# Patient Record
Sex: FEMALE | Race: WHITE | NOT HISPANIC OR LATINO | Employment: FULL TIME | URBAN - METROPOLITAN AREA
[De-identification: names, ages, dates, MRNs, and addresses within clinical notes are randomized per-mention and may not be internally consistent; named-entity substitution may affect disease eponyms.]

---

## 2017-05-03 ENCOUNTER — GENERIC CONVERSION - ENCOUNTER (OUTPATIENT)
Dept: OTHER | Facility: OTHER | Age: 32
End: 2017-05-03

## 2017-05-19 ENCOUNTER — ALLSCRIPTS OFFICE VISIT (OUTPATIENT)
Dept: OTHER | Facility: OTHER | Age: 32
End: 2017-05-19

## 2017-05-19 DIAGNOSIS — E78.00 PURE HYPERCHOLESTEROLEMIA: ICD-10-CM

## 2017-05-19 DIAGNOSIS — E66.01 MORBID (SEVERE) OBESITY DUE TO EXCESS CALORIES (HCC): ICD-10-CM

## 2017-05-19 DIAGNOSIS — I10 ESSENTIAL (PRIMARY) HYPERTENSION: ICD-10-CM

## 2017-06-06 ENCOUNTER — ANESTHESIA EVENT (OUTPATIENT)
Dept: GASTROENTEROLOGY | Facility: HOSPITAL | Age: 32
End: 2017-06-06
Payer: COMMERCIAL

## 2017-06-06 RX ORDER — CHOLECALCIFEROL (VITAMIN D3) 50 MCG
2000 TABLET ORAL DAILY
COMMUNITY
End: 2018-02-16 | Stop reason: ALTCHOICE

## 2017-06-06 RX ORDER — ROSUVASTATIN CALCIUM 5 MG/1
5 TABLET, COATED ORAL EVERY EVENING
COMMUNITY
End: 2018-08-13 | Stop reason: SDUPTHER

## 2017-06-06 RX ORDER — DIPHENOXYLATE HYDROCHLORIDE AND ATROPINE SULFATE 2.5; .025 MG/1; MG/1
1 TABLET ORAL DAILY
COMMUNITY

## 2017-06-07 ENCOUNTER — ANESTHESIA (OUTPATIENT)
Dept: GASTROENTEROLOGY | Facility: HOSPITAL | Age: 32
End: 2017-06-07
Payer: COMMERCIAL

## 2017-06-07 ENCOUNTER — HOSPITAL ENCOUNTER (OUTPATIENT)
Facility: HOSPITAL | Age: 32
Setting detail: OUTPATIENT SURGERY
Discharge: HOME | End: 2017-06-07
Attending: SURGERY | Admitting: SURGERY
Payer: COMMERCIAL

## 2017-06-07 VITALS
SYSTOLIC BLOOD PRESSURE: 129 MMHG | HEIGHT: 62 IN | HEART RATE: 82 BPM | OXYGEN SATURATION: 95 % | DIASTOLIC BLOOD PRESSURE: 83 MMHG | TEMPERATURE: 97.5 F | RESPIRATION RATE: 20 BRPM | WEIGHT: 231.5 LBS | BODY MASS INDEX: 42.6 KG/M2

## 2017-06-07 DIAGNOSIS — E66.9 OBESITY: ICD-10-CM

## 2017-06-07 LAB — EXT PREGNANCY TEST URINE: NEGATIVE

## 2017-06-07 PROCEDURE — 88342 IMHCHEM/IMCYTCHM 1ST ANTB: CPT | Performed by: SURGERY

## 2017-06-07 PROCEDURE — 88305 TISSUE EXAM BY PATHOLOGIST: CPT | Performed by: SURGERY

## 2017-06-07 PROCEDURE — 81025 URINE PREGNANCY TEST: CPT | Performed by: ANESTHESIOLOGY

## 2017-06-07 RX ORDER — SODIUM CHLORIDE 9 MG/ML
125 INJECTION, SOLUTION INTRAVENOUS CONTINUOUS
Status: DISCONTINUED | OUTPATIENT
Start: 2017-06-07 | End: 2017-06-07 | Stop reason: HOSPADM

## 2017-06-07 RX ORDER — PROPOFOL 10 MG/ML
INJECTION, EMULSION INTRAVENOUS AS NEEDED
Status: DISCONTINUED | OUTPATIENT
Start: 2017-06-07 | End: 2017-06-07 | Stop reason: SURG

## 2017-06-07 RX ORDER — ALBUTEROL SULFATE 2.5 MG/3ML
2.5 SOLUTION RESPIRATORY (INHALATION) AS NEEDED
COMMUNITY
End: 2019-08-20

## 2017-06-07 RX ADMIN — PROPOFOL 100 MG: 10 INJECTION, EMULSION INTRAVENOUS at 13:03

## 2017-06-07 RX ADMIN — PROPOFOL 100 MG: 10 INJECTION, EMULSION INTRAVENOUS at 13:05

## 2017-06-07 RX ADMIN — SODIUM CHLORIDE 125 ML/HR: 0.9 INJECTION, SOLUTION INTRAVENOUS at 12:06

## 2017-06-07 RX ADMIN — LIDOCAINE HYDROCHLORIDE 50 MG: 20 INJECTION, SOLUTION INTRAVENOUS at 13:03

## 2017-06-09 ENCOUNTER — GENERIC CONVERSION - ENCOUNTER (OUTPATIENT)
Dept: OTHER | Facility: OTHER | Age: 32
End: 2017-06-09

## 2017-06-20 ENCOUNTER — GENERIC CONVERSION - ENCOUNTER (OUTPATIENT)
Dept: OTHER | Facility: OTHER | Age: 32
End: 2017-06-20

## 2017-07-03 ENCOUNTER — GENERIC CONVERSION - ENCOUNTER (OUTPATIENT)
Dept: OTHER | Facility: OTHER | Age: 32
End: 2017-07-03

## 2017-07-27 ENCOUNTER — ALLSCRIPTS OFFICE VISIT (OUTPATIENT)
Dept: OTHER | Facility: OTHER | Age: 32
End: 2017-07-27

## 2017-07-27 ENCOUNTER — ANESTHESIA EVENT (OUTPATIENT)
Dept: PERIOP | Facility: HOSPITAL | Age: 32
DRG: 621 | End: 2017-07-27
Payer: COMMERCIAL

## 2017-08-01 ENCOUNTER — GENERIC CONVERSION - ENCOUNTER (OUTPATIENT)
Dept: OTHER | Facility: OTHER | Age: 32
End: 2017-08-01

## 2017-08-07 ENCOUNTER — ANESTHESIA (OUTPATIENT)
Dept: PERIOP | Facility: HOSPITAL | Age: 32
DRG: 621 | End: 2017-08-07
Payer: COMMERCIAL

## 2017-08-07 ENCOUNTER — HOSPITAL ENCOUNTER (INPATIENT)
Facility: HOSPITAL | Age: 32
LOS: 1 days | Discharge: HOME | DRG: 621 | End: 2017-08-08
Attending: SURGERY | Admitting: SURGERY
Payer: COMMERCIAL

## 2017-08-07 DIAGNOSIS — E78.5 HYPERLIPIDEMIA, UNSPECIFIED HYPERLIPIDEMIA TYPE: ICD-10-CM

## 2017-08-07 DIAGNOSIS — E66.01 MORBID OBESITY, UNSPECIFIED OBESITY TYPE (HCC): Primary | ICD-10-CM

## 2017-08-07 LAB — EXT PREGNANCY TEST URINE: NEGATIVE

## 2017-08-07 PROCEDURE — 0D164ZA BYPASS STOMACH TO JEJUNUM, PERCUTANEOUS ENDOSCOPIC APPROACH: ICD-10-PCS | Performed by: SURGERY

## 2017-08-07 PROCEDURE — 94760 N-INVAS EAR/PLS OXIMETRY 1: CPT

## 2017-08-07 PROCEDURE — 81025 URINE PREGNANCY TEST: CPT | Performed by: ANESTHESIOLOGY

## 2017-08-07 RX ORDER — ALBUTEROL SULFATE 2.5 MG/3ML
2.5 SOLUTION RESPIRATORY (INHALATION) AS NEEDED
Status: DISCONTINUED | OUTPATIENT
Start: 2017-08-07 | End: 2017-08-07

## 2017-08-07 RX ORDER — PROMETHAZINE HYDROCHLORIDE 25 MG/ML
25 INJECTION, SOLUTION INTRAMUSCULAR; INTRAVENOUS EVERY 4 HOURS PRN
Status: DISCONTINUED | OUTPATIENT
Start: 2017-08-07 | End: 2017-08-08 | Stop reason: HOSPADM

## 2017-08-07 RX ORDER — MORPHINE SULFATE 4 MG/ML
4 INJECTION, SOLUTION INTRAMUSCULAR; INTRAVENOUS EVERY 2 HOUR PRN
Status: DISCONTINUED | OUTPATIENT
Start: 2017-08-07 | End: 2017-08-08 | Stop reason: HOSPADM

## 2017-08-07 RX ORDER — ONDANSETRON 2 MG/ML
INJECTION INTRAMUSCULAR; INTRAVENOUS AS NEEDED
Status: DISCONTINUED | OUTPATIENT
Start: 2017-08-07 | End: 2017-08-07 | Stop reason: SURG

## 2017-08-07 RX ORDER — ONDANSETRON 2 MG/ML
4 INJECTION INTRAMUSCULAR; INTRAVENOUS ONCE AS NEEDED
Status: COMPLETED | OUTPATIENT
Start: 2017-08-07 | End: 2017-08-07

## 2017-08-07 RX ORDER — ACETAMINOPHEN 160 MG/5ML
320 SUSPENSION, ORAL (FINAL DOSE FORM) ORAL EVERY 4 HOURS PRN
Status: DISCONTINUED | OUTPATIENT
Start: 2017-08-07 | End: 2017-08-08 | Stop reason: HOSPADM

## 2017-08-07 RX ORDER — MAGNESIUM HYDROXIDE 1200 MG/15ML
LIQUID ORAL AS NEEDED
Status: DISCONTINUED | OUTPATIENT
Start: 2017-08-07 | End: 2017-08-07 | Stop reason: HOSPADM

## 2017-08-07 RX ORDER — MIDAZOLAM HYDROCHLORIDE 1 MG/ML
INJECTION INTRAMUSCULAR; INTRAVENOUS AS NEEDED
Status: DISCONTINUED | OUTPATIENT
Start: 2017-08-07 | End: 2017-08-07 | Stop reason: SURG

## 2017-08-07 RX ORDER — LEVOFLOXACIN 5 MG/ML
750 INJECTION, SOLUTION INTRAVENOUS ONCE
Status: COMPLETED | OUTPATIENT
Start: 2017-08-07 | End: 2017-08-07

## 2017-08-07 RX ORDER — ALBUTEROL SULFATE 2.5 MG/3ML
2.5 SOLUTION RESPIRATORY (INHALATION) EVERY 6 HOURS PRN
Status: DISCONTINUED | OUTPATIENT
Start: 2017-08-07 | End: 2017-08-07

## 2017-08-07 RX ORDER — HEPARIN SODIUM 5000 [USP'U]/ML
5000 INJECTION, SOLUTION INTRAVENOUS; SUBCUTANEOUS ONCE
Status: COMPLETED | OUTPATIENT
Start: 2017-08-07 | End: 2017-08-07

## 2017-08-07 RX ORDER — SCOLOPAMINE TRANSDERMAL SYSTEM 1 MG/1
1 PATCH, EXTENDED RELEASE TRANSDERMAL ONCE AS NEEDED
Status: DISCONTINUED | OUTPATIENT
Start: 2017-08-07 | End: 2017-08-07

## 2017-08-07 RX ORDER — SODIUM CHLORIDE, SODIUM LACTATE, POTASSIUM CHLORIDE, CALCIUM CHLORIDE 600; 310; 30; 20 MG/100ML; MG/100ML; MG/100ML; MG/100ML
50 INJECTION, SOLUTION INTRAVENOUS CONTINUOUS
Status: DISCONTINUED | OUTPATIENT
Start: 2017-08-07 | End: 2017-08-08 | Stop reason: HOSPADM

## 2017-08-07 RX ORDER — OXYCODONE HCL 5 MG/5 ML
10 SOLUTION, ORAL ORAL EVERY 4 HOURS PRN
Status: DISCONTINUED | OUTPATIENT
Start: 2017-08-07 | End: 2017-08-08 | Stop reason: HOSPADM

## 2017-08-07 RX ORDER — EPHEDRINE SULFATE 50 MG/ML
INJECTION, SOLUTION INTRAVENOUS AS NEEDED
Status: DISCONTINUED | OUTPATIENT
Start: 2017-08-07 | End: 2017-08-07 | Stop reason: SURG

## 2017-08-07 RX ORDER — GLYCOPYRROLATE 0.2 MG/ML
INJECTION INTRAMUSCULAR; INTRAVENOUS AS NEEDED
Status: DISCONTINUED | OUTPATIENT
Start: 2017-08-07 | End: 2017-08-07 | Stop reason: SURG

## 2017-08-07 RX ORDER — PANTOPRAZOLE SODIUM 40 MG/1
40 INJECTION, POWDER, FOR SOLUTION INTRAVENOUS
Status: DISCONTINUED | OUTPATIENT
Start: 2017-08-08 | End: 2017-08-08 | Stop reason: HOSPADM

## 2017-08-07 RX ORDER — MORPHINE SULFATE 2 MG/ML
2 INJECTION, SOLUTION INTRAMUSCULAR; INTRAVENOUS EVERY 2 HOUR PRN
Status: DISCONTINUED | OUTPATIENT
Start: 2017-08-07 | End: 2017-08-08 | Stop reason: HOSPADM

## 2017-08-07 RX ORDER — FENTANYL CITRATE 50 UG/ML
INJECTION, SOLUTION INTRAMUSCULAR; INTRAVENOUS AS NEEDED
Status: DISCONTINUED | OUTPATIENT
Start: 2017-08-07 | End: 2017-08-07 | Stop reason: SURG

## 2017-08-07 RX ORDER — ROCURONIUM BROMIDE 10 MG/ML
INJECTION, SOLUTION INTRAVENOUS AS NEEDED
Status: DISCONTINUED | OUTPATIENT
Start: 2017-08-07 | End: 2017-08-07 | Stop reason: SURG

## 2017-08-07 RX ORDER — PROPOFOL 10 MG/ML
INJECTION, EMULSION INTRAVENOUS AS NEEDED
Status: DISCONTINUED | OUTPATIENT
Start: 2017-08-07 | End: 2017-08-07 | Stop reason: SURG

## 2017-08-07 RX ORDER — OXYCODONE HCL 5 MG/5 ML
5 SOLUTION, ORAL ORAL EVERY 4 HOURS PRN
Status: DISCONTINUED | OUTPATIENT
Start: 2017-08-07 | End: 2017-08-08 | Stop reason: HOSPADM

## 2017-08-07 RX ORDER — HYDROMORPHONE HYDROCHLORIDE 2 MG/ML
INJECTION, SOLUTION INTRAMUSCULAR; INTRAVENOUS; SUBCUTANEOUS AS NEEDED
Status: DISCONTINUED | OUTPATIENT
Start: 2017-08-07 | End: 2017-08-07 | Stop reason: SURG

## 2017-08-07 RX ORDER — ONDANSETRON 2 MG/ML
4 INJECTION INTRAMUSCULAR; INTRAVENOUS EVERY 4 HOURS PRN
Status: DISCONTINUED | OUTPATIENT
Start: 2017-08-07 | End: 2017-08-08 | Stop reason: HOSPADM

## 2017-08-07 RX ORDER — SODIUM CHLORIDE 9 MG/ML
125 INJECTION, SOLUTION INTRAVENOUS CONTINUOUS
Status: DISCONTINUED | OUTPATIENT
Start: 2017-08-07 | End: 2017-08-07

## 2017-08-07 RX ORDER — SERTRALINE HYDROCHLORIDE 25 MG/1
50 TABLET, FILM COATED ORAL
Status: DISCONTINUED | OUTPATIENT
Start: 2017-08-07 | End: 2017-08-08 | Stop reason: HOSPADM

## 2017-08-07 RX ORDER — BUPIVACAINE HYDROCHLORIDE AND EPINEPHRINE 5; 5 MG/ML; UG/ML
INJECTION, SOLUTION PERINEURAL AS NEEDED
Status: DISCONTINUED | OUTPATIENT
Start: 2017-08-07 | End: 2017-08-07 | Stop reason: HOSPADM

## 2017-08-07 RX ADMIN — HYDROMORPHONE HYDROCHLORIDE 0.5 MG: 2 INJECTION, SOLUTION INTRAMUSCULAR; INTRAVENOUS; SUBCUTANEOUS at 15:05

## 2017-08-07 RX ADMIN — ROCURONIUM BROMIDE 10 MG: 10 INJECTION, SOLUTION INTRAVENOUS at 14:07

## 2017-08-07 RX ADMIN — HYDROMORPHONE HYDROCHLORIDE 0.25 MG: 2 INJECTION, SOLUTION INTRAMUSCULAR; INTRAVENOUS; SUBCUTANEOUS at 14:55

## 2017-08-07 RX ADMIN — SODIUM CHLORIDE 125 ML/HR: 0.9 INJECTION, SOLUTION INTRAVENOUS at 11:27

## 2017-08-07 RX ADMIN — HYDROMORPHONE HYDROCHLORIDE 0.5 MG: 2 INJECTION, SOLUTION INTRAMUSCULAR; INTRAVENOUS; SUBCUTANEOUS at 14:05

## 2017-08-07 RX ADMIN — EPHEDRINE SULFATE 5 MG: 50 INJECTION, SOLUTION INTRAMUSCULAR; INTRAVENOUS; SUBCUTANEOUS at 13:20

## 2017-08-07 RX ADMIN — ONDANSETRON 4 MG: 2 INJECTION INTRAMUSCULAR; INTRAVENOUS at 22:00

## 2017-08-07 RX ADMIN — SCOPALAMINE 1 PATCH: 1 PATCH, EXTENDED RELEASE TRANSDERMAL at 11:28

## 2017-08-07 RX ADMIN — FENTANYL CITRATE 50 MCG: 50 INJECTION, SOLUTION INTRAMUSCULAR; INTRAVENOUS at 13:30

## 2017-08-07 RX ADMIN — FENTANYL CITRATE 50 MCG: 50 INJECTION, SOLUTION INTRAMUSCULAR; INTRAVENOUS at 13:26

## 2017-08-07 RX ADMIN — SODIUM CHLORIDE, SODIUM LACTATE, POTASSIUM CHLORIDE, AND CALCIUM CHLORIDE 125 ML/HR: .6; .31; .03; .02 INJECTION, SOLUTION INTRAVENOUS at 16:20

## 2017-08-07 RX ADMIN — GLYCOPYRROLATE 0.4 MG: 0.2 INJECTION, SOLUTION INTRAMUSCULAR; INTRAVENOUS at 14:53

## 2017-08-07 RX ADMIN — HYDROMORPHONE HYDROCHLORIDE 0.25 MG: 2 INJECTION, SOLUTION INTRAMUSCULAR; INTRAVENOUS; SUBCUTANEOUS at 14:54

## 2017-08-07 RX ADMIN — PROMETHAZINE HYDROCHLORIDE 25 MG: 25 INJECTION INTRAMUSCULAR; INTRAVENOUS at 18:12

## 2017-08-07 RX ADMIN — METRONIDAZOLE 500 MG: 500 INJECTION, SOLUTION INTRAVENOUS at 18:14

## 2017-08-07 RX ADMIN — LEVOFLOXACIN: 5 INJECTION, SOLUTION INTRAVENOUS at 13:08

## 2017-08-07 RX ADMIN — PROPOFOL 200 MG: 10 INJECTION, EMULSION INTRAVENOUS at 13:03

## 2017-08-07 RX ADMIN — ONDANSETRON HYDROCHLORIDE 4 MG: 2 INJECTION, SOLUTION INTRAVENOUS at 13:28

## 2017-08-07 RX ADMIN — HYDROMORPHONE HYDROCHLORIDE 0.5 MG: 1 INJECTION, SOLUTION INTRAMUSCULAR; INTRAVENOUS; SUBCUTANEOUS at 15:52

## 2017-08-07 RX ADMIN — TRIMETHOBENZAMIDE HYDROCHLORIDE 200 MG: 100 INJECTION INTRAMUSCULAR at 15:43

## 2017-08-07 RX ADMIN — ONDANSETRON 4 MG: 2 INJECTION INTRAMUSCULAR; INTRAVENOUS at 15:30

## 2017-08-07 RX ADMIN — HYDROMORPHONE HYDROCHLORIDE 0.5 MG: 2 INJECTION, SOLUTION INTRAMUSCULAR; INTRAVENOUS; SUBCUTANEOUS at 14:12

## 2017-08-07 RX ADMIN — HEPARIN SODIUM 5000 UNITS: 5000 INJECTION, SOLUTION INTRAVENOUS; SUBCUTANEOUS at 12:20

## 2017-08-07 RX ADMIN — ONDANSETRON HYDROCHLORIDE 4 MG: 2 INJECTION, SOLUTION INTRAVENOUS at 13:32

## 2017-08-07 RX ADMIN — MIDAZOLAM HYDROCHLORIDE 2 MG: 1 INJECTION, SOLUTION INTRAMUSCULAR; INTRAVENOUS at 12:54

## 2017-08-07 RX ADMIN — DEXAMETHASONE SODIUM PHOSPHATE 4 MG: 10 INJECTION INTRAMUSCULAR; INTRAVENOUS at 13:28

## 2017-08-07 RX ADMIN — NEOSTIGMINE METHYLSULFATE 2.5 MG: 1 INJECTION, SOLUTION INTRAMUSCULAR; INTRAVENOUS; SUBCUTANEOUS at 14:53

## 2017-08-07 RX ADMIN — FENTANYL CITRATE 100 MCG: 50 INJECTION, SOLUTION INTRAMUSCULAR; INTRAVENOUS at 13:03

## 2017-08-07 RX ADMIN — ROCURONIUM BROMIDE 50 MG: 10 INJECTION, SOLUTION INTRAVENOUS at 13:03

## 2017-08-07 RX ADMIN — DEXAMETHASONE SODIUM PHOSPHATE 4 MG: 10 INJECTION INTRAMUSCULAR; INTRAVENOUS at 13:32

## 2017-08-08 VITALS
RESPIRATION RATE: 20 BRPM | SYSTOLIC BLOOD PRESSURE: 143 MMHG | TEMPERATURE: 98.1 F | OXYGEN SATURATION: 96 % | HEART RATE: 67 BPM | BODY MASS INDEX: 40.37 KG/M2 | WEIGHT: 219.38 LBS | HEIGHT: 62 IN | DIASTOLIC BLOOD PRESSURE: 68 MMHG

## 2017-08-08 LAB
ANION GAP SERPL CALCULATED.3IONS-SCNC: 8 MMOL/L (ref 4–13)
BUN SERPL-MCNC: 6 MG/DL (ref 5–25)
CALCIUM SERPL-MCNC: 8.8 MG/DL (ref 8.3–10.1)
CHLORIDE SERPL-SCNC: 102 MMOL/L (ref 100–108)
CO2 SERPL-SCNC: 27 MMOL/L (ref 21–32)
CREAT SERPL-MCNC: 0.79 MG/DL (ref 0.6–1.3)
ERYTHROCYTE [DISTWIDTH] IN BLOOD BY AUTOMATED COUNT: 13 % (ref 11.6–15.1)
GFR SERPL CREATININE-BSD FRML MDRD: 99 ML/MIN/1.73SQ M
GLUCOSE SERPL-MCNC: 111 MG/DL (ref 65–140)
HCT VFR BLD AUTO: 38.8 % (ref 34.8–46.1)
HGB BLD-MCNC: 13 G/DL (ref 11.5–15.4)
MCH RBC QN AUTO: 30 PG (ref 26.8–34.3)
MCHC RBC AUTO-ENTMCNC: 33.5 G/DL (ref 31.4–37.4)
MCV RBC AUTO: 89 FL (ref 82–98)
PLATELET # BLD AUTO: 342 THOUSANDS/UL (ref 149–390)
PMV BLD AUTO: 9.5 FL (ref 8.9–12.7)
POTASSIUM SERPL-SCNC: 3.8 MMOL/L (ref 3.5–5.3)
RBC # BLD AUTO: 4.34 MILLION/UL (ref 3.81–5.12)
SODIUM SERPL-SCNC: 137 MMOL/L (ref 136–145)
WBC # BLD AUTO: 15.79 THOUSAND/UL (ref 4.31–10.16)

## 2017-08-08 PROCEDURE — C9113 INJ PANTOPRAZOLE SODIUM, VIA: HCPCS | Performed by: SURGERY

## 2017-08-08 PROCEDURE — 80048 BASIC METABOLIC PNL TOTAL CA: CPT | Performed by: SURGERY

## 2017-08-08 PROCEDURE — 85027 COMPLETE CBC AUTOMATED: CPT | Performed by: SURGERY

## 2017-08-08 RX ORDER — OXYCODONE HYDROCHLORIDE AND ACETAMINOPHEN 5; 325 MG/1; MG/1
1 TABLET ORAL EVERY 4 HOURS PRN
Qty: 30 TABLET | Refills: 0
Start: 2017-08-08 | End: 2019-08-20 | Stop reason: ALTCHOICE

## 2017-08-08 RX ORDER — OMEPRAZOLE 20 MG/1
20 CAPSULE, DELAYED RELEASE ORAL DAILY
Qty: 30 CAPSULE | Refills: 0
Start: 2017-08-08 | End: 2018-02-16 | Stop reason: ALTCHOICE

## 2017-08-08 RX ADMIN — METRONIDAZOLE 500 MG: 500 INJECTION, SOLUTION INTRAVENOUS at 02:17

## 2017-08-08 RX ADMIN — PANTOPRAZOLE SODIUM 40 MG: 40 INJECTION, POWDER, FOR SOLUTION INTRAVENOUS at 08:53

## 2017-08-08 RX ADMIN — ACETAMINOPHEN 325 MG: 160 SUSPENSION ORAL at 13:35

## 2017-08-08 RX ADMIN — SODIUM CHLORIDE, SODIUM LACTATE, POTASSIUM CHLORIDE, AND CALCIUM CHLORIDE 125 ML/HR: .6; .31; .03; .02 INJECTION, SOLUTION INTRAVENOUS at 00:49

## 2017-08-10 ENCOUNTER — GENERIC CONVERSION - ENCOUNTER (OUTPATIENT)
Dept: OTHER | Facility: OTHER | Age: 32
End: 2017-08-10

## 2017-08-17 ENCOUNTER — ALLSCRIPTS OFFICE VISIT (OUTPATIENT)
Dept: OTHER | Facility: OTHER | Age: 32
End: 2017-08-17

## 2017-08-30 ENCOUNTER — GENERIC CONVERSION - ENCOUNTER (OUTPATIENT)
Dept: OTHER | Facility: OTHER | Age: 32
End: 2017-08-30

## 2017-09-12 ENCOUNTER — GENERIC CONVERSION - ENCOUNTER (OUTPATIENT)
Dept: OTHER | Facility: OTHER | Age: 32
End: 2017-09-12

## 2017-11-15 ENCOUNTER — ALLSCRIPTS OFFICE VISIT (OUTPATIENT)
Dept: OTHER | Facility: OTHER | Age: 32
End: 2017-11-15

## 2017-11-15 DIAGNOSIS — Z98.84 BARIATRIC SURGERY STATUS: ICD-10-CM

## 2017-11-15 DIAGNOSIS — E78.00 PURE HYPERCHOLESTEROLEMIA: ICD-10-CM

## 2017-11-15 DIAGNOSIS — I10 ESSENTIAL (PRIMARY) HYPERTENSION: ICD-10-CM

## 2017-11-15 DIAGNOSIS — E66.9 OBESITY: ICD-10-CM

## 2017-11-15 DIAGNOSIS — K91.2 POSTSURGICAL MALABSORPTION, NOT ELSEWHERE CLASSIFIED (CODE): ICD-10-CM

## 2017-11-16 NOTE — PROGRESS NOTES
Assessment    1  Status post bariatric surgery (V45 86) (Z98 84)   2  Intestinal malabsorption following gastrectomy (579 3) (K91 2,Z90 3)   3  Benign essential hypertension (401 1) (I10)   4  Hypercholesterolemia (272 0) (E78 00)   5  Obesity (BMI 30-39 9) (278 00) (E66 9)   6  Muscular abdominal pain in left lower quadrant (789 04) (M79 1,R10 32)    Plan  Benign essential hypertension, Hypercholesterolemia, Intestinal malabsorptionfollowing gastrectomy, Obesity (BMI 30-39 9), Status postbariatric surgery    · (1) CBC/ PLT (NO DIFF); Status:Active; Requested for:05Dng5532;    Perform:[facility]; WKD:32WEK9190; Ordered; For:Benign essential hypertension, Hypercholesterolemia, Intestinal malabsorption following gastrectomy, Obesity (BMI 30-39 9), Status post bariatric surgery; Ordered By:Camila Osullivan;   · (1) COMPREHENSIVE METABOLIC PANEL; Status:Active; Requested for:80Ofd1958;    Perform:[facility]; ZEK:[id number]; Ordered; For:Benign essential hypertension, Hypercholesterolemia, Intestinal malabsorption following gastrectomy, Obesity (BMI 30-39 9), Status post bariatric surgery; Ordered By:Camila Osullivan;   · (1) FERRITIN; Status:Active; Requested for:45Jru7827;    Perform:[facility]; BEB:74WYO8043; Ordered; For:Benign essential hypertension, Hypercholesterolemia, Intestinal malabsorption following gastrectomy, Obesity (BMI 30-39 9), Status post bariatric surgery; Ordered By:Camila Osullivan;   · (1) FOLATE; Status:Active; Requested for:56Rbm4814;    Perform:[facility]; SVJ:83XZF6365; Ordered; For:Benign essential hypertension, Hypercholesterolemia, Intestinal malabsorption following gastrectomy, Obesity (BMI 30-39 9), Status post bariatric surgery; Ordered By:Camila Osullivan;   · (1) LIPID PANEL, FASTING; Status:Active; Requested for:34Qeq2688;    Perform:[facility]; RBS:06BNA2040; Ordered; For:Benign essential hypertension, Hypercholesterolemia, Intestinal malabsorption following gastrectomy, Obesity (BMI 30-39 9), Status post bariatric surgery; Ordered By:Katherine Osullivan;   · (1) PTH N-TERMINAL (INTACT); Status:Active; Requested for:16Hdv2194;    Perform:[facility]; :78PHR1953; Ordered; For:Benign essential hypertension, Hypercholesterolemia, Intestinal malabsorption following gastrectomy, Obesity (BMI 30-39 9), Status post bariatric surgery; Ordered By:Lissette Osullivan;   · (1) VITAMIN A; Status:Active; Requested for:74Sad9681;    Perform:[facility]; QMI:77SVL4414; Ordered; For:Benign essential hypertension, Hypercholesterolemia, Intestinal malabsorption following gastrectomy, Obesity (BMI 30-39 9), Status post bariatric surgery; Ordered By:Katherine Osullivan;   · (1) VITAMIN B1, WHOLE BLOOD; Status:Active; Requested for:03Hte3639;    Perform:[facility]; MTN:[id number]; Ordered; For:Benign essential hypertension, Hypercholesterolemia, Intestinal malabsorption following gastrectomy, Obesity (BMI 30-39 9), Status post bariatric surgery; Ordered By:Lissette Osullivan;   · (1) VITAMIN B12; Status:Active; Requested for:96Fqf3462;    Perform:[facility]; OQV:01BPJ1879; Ordered; For:Benign essential hypertension, Hypercholesterolemia, Intestinal malabsorption following gastrectomy, Obesity (BMI 30-39 9), Status post bariatric surgery; Ordered By:Lissette Osullivan;   · (1) VITAMIN D 25-HYDROXY; Status:Active; Requested for:80Psc5396;    Perform:[facility]; ZEL:49TEY9708; Ordered;essential hypertension, Hypercholesterolemia, Intestinal malabsorption following gastrectomy, Obesity (BMI 30-39 9), Status post bariatric surgery; Ordered By:Lissette Osullivan;    Discussion/Summary    Follow up in 3 months  if abdominal pain continues or is worse, consider making sooner follow-up  Call the office in a couple weeks to let me know how you are doing since you do not want a sooner follow-up  Follow diet as discussed   Get lab work done prior to your next office visit  Follow vitamin/mineral recommendations as reviewed with you  Exercise as tolerated  our office if you have any problems with abdominal pain especially if associated with fever, chills, nausea, vomiting, or any other concerns  lap Myrtle-en-y gastric bypass surgery /obesity /bmi 31 8  2  left lower abdominal pain (likely muscular)  year old female   status post laparoscopic Myrtle-en-Y gastric bypass surgery by Dr Clarke Hicks  is here for routine follow-up  she notes since surgery she has had some discomfort to Left lower incision site-like a side sticker that is worse with movement/bending/lifting- and lasts for seconds at a time-averaging less often than early post-op and approximately 3-4 times per week    No associated fever, chills, nausea/vomiting and she is moving her bowels regularly  exam abdomen is benign  patient that pain is most consistent with muscular pain  Advised to take 325 -500 mg tylenol/acetaminophen every 6 hours for the next week  Rest as able-avoid activities that bring on the pain  Heat to the area for 10-15 minutes on and 15-20 minutes off as able  Do not sleep with heating pad her a sooner follow-up but she will call if anything worsens or if it is not relieving  daily ppi  hour diet recall was obtainedtolerating a regular dietnot consistently eating at least 60 grams of protein per day-advised on ways to boost protein hsbeevt81/60 minute rule with liquidsat least 64 ounces of fluid per dayexercising regularly  Malabsorption- pateint is at risk for malabsorption of vitamins/minerals secondary to malabsorption from her procedure and restriction of intakescurrent supplements and advised on sameis taking 4 bariatric fusion, one calcium and an additional vitamin B12- she is due for routine labs-ordering this now  HTN- controlled off medication    hyperlipidemia- was controlled pre-op on medication/ she is off statin- will check FLP with routine labs  tolerating a regular dieteating at least 60 grams of protein per day30/60 minute rule with liquidsat least 64 ounces of fluid per dayexercising regularly Patient is here for routine follow-up  Has no complaints todaytolerating a regular dieteating at least 60 grams of protein per day30/60 minute rule with liquidsat least 64 ounces of fluid per dayexercising regularly  The patient has the current Goals: Resolve abdominal/muscular painweight loss with good nutrition intakesvitamin/mineral levelsas tolerated  The patent has the current Barriers: None identified  Patient is able to Self-Care  Educational resources provided: N/a  Possible side effects of new medications were reviewed with the patient/guardian today  The treatment plan was reviewed with the patient/guardian  The patient/guardian understands and agrees with the treatment plan   She was advised to follow up due to malabsorption risks  Self Referrals: No      Chief Complaint  Patient in office today for 2nd post op visit  She is complaining of left lower sided tenderness  She is exercising and taking vitamins  [facility] 71 Bariatric Surgery:  Otilio Wynn is status post laparoscopic Myrtle-en-Y procedure,-- performed on 8/7/2017--   by Dr Hillery Duane  HPI: today's weight is 174 lb pounds,-- today's BMI is 31 8-- and-- her total weight loss is 60% excess body weight loss pounds  The patient reports no nausea,-- no vomiting,-- no constipation,-- no diarrhea,-- no chest pain-- and-- no abdominal pain  Diet and Exercise: Diet history reviewed and discussed with the patient  Weight loss/gains to date discussed with the patient  Supplements: B-12-- and-- calcium  PE:  Abdominal exam: soft,-- nontender,-- no rebound tenderness,-- no guarding-- and-- no incisional hernia  Assessment:  Post-op, the patient is doing well  Plan: Activity restrictions: None    Instructions / Recommendations: recommended a daily protein intake of  grams,-- vitamin supplement(s) recommended,-- mineral supplement(s) recommended,-- diet as discussed/rest for a few days-but walk-- and-- instructed to call the office for concerns, questions, or problems  The patient was instructed to follow up in 3 months,-- Consider sooner follow-up as needed if left lower pain is any worse  Review of Systems   Constitutional: planned weight loss, but-- no fever,-- not feeling poorly-- and-- no chills  Cardiovascular: no chest pain-- and-- no palpitations  Respiratory: no shortness of breath-- and-- no wheezing  Gastrointestinal: abdominal pain, but-- no nausea,-- no vomiting,-- no constipation-- and-- no diarrhea  Psychiatric: no anxiety-- and-- no depression  Active Problems  1  Asthma (493 90) (J45 909)   2  Benign essential hypertension (401 1) (I10)   3  Hypercholesterolemia (272 0) (E78 00)   4  Intestinal malabsorption following gastrectomy (579 3) (K91 2,Z90 3)   5  Irregular heart beat (427 9) (I49 9)   6  Irritable bowel syndrome (564 1) (K58 9)   7  Morbid obesity (278 01) (E66 01)   8  Status post bariatric surgery (V45 86) (Z98 84)    Social History     · Never a smoker   · No illicit drug use   · Social alcohol use (Z78 9)  The social history was reviewed and updated today  Current Meds   1  Multi-Day Oral Tablet; Therapy: (Recorded:88Qar7811) to Recorded   2  Omeprazole 20 MG Oral Capsule Delayed Release; TAKE 1 CAPSULE DAILY EVERY MORNING BEFORE BREAKFAST; Therapy: 29BMY9280 to (Evaluate:24Nov2017)  Requested for: 02Lhq0605; Last Rx:35Heg3590 Ordered   3  Vitamin B12 TABS; Therapy: (Recorded:73Wpo2800) to Recorded   4  Vitamin D 2000 UNIT Oral Capsule; Therapy: (Recorded:54Exz4279) to Recorded    The medication list was reviewed and updated today  Allergies  1  Influenza Virus Vaccine Whole   2  Iodinated Contrast Media   3  Penicillins   4   Sulfa Drugs    Vitals   Recorded: 03MEP0727 10:40AM   Temperature 97 F   Heart Rate 72 Respiration 18   Systolic 539   Diastolic 72   Height 5 ft 2 in   Weight 174 lb    BMI Calculated 31 83   BSA Calculated 1 8       Physical Exam   Constitutional  General appearance: No acute distress, well appearing and well nourished  Eyes bilateral conjunctiva without pallor  Ears, Nose, Mouth, and Throat mucous membranes moist   Pulmonary  Respiratory effort: No increased work of breathing or signs of respiratory distress  Auscultation of lungs: Clear to auscultation  Cardiovascular  Auscultation of heart: Normal rate and rhythm, normal S1 and S2, without murmurs  Abdomen soft, +BS, NT to palpation in all 4 quadrants without rebound or guarding; no incisional hernias apprecaited    Musculoskeletal  Gait and station: Normal    Psychiatric  Orientation to person, place, and time: Normal    Mood and affect: Normal          Future Appointments    Date/Time Provider Specialty Site   02/20/2018 09:30 AM Triston Manriquez, [facility] General Surgery [facility]       Signatures   Electronically signed by : Jhon Casas, [facility]; Nov 15 2017 11:41AM EST                       (Author)    Electronically signed by : ALEXIS Mcclendon ; Nov 15 2017  4:11PM EST                       (Co-author)
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
## 2018-01-11 NOTE — PROGRESS NOTES
Message  Outreach phone call; how is patient coming along with protein diet? She said she was doing fine  Patient encouraged to reach out to staff if in need of support or if any ? [place]      Active Problems    1  Asthma (493 90) (J45 909)   2  Benign essential hypertension (401 1) (I10)   3  Hypercholesterolemia (272 0) (E78 00)   4  Irregular heart beat (427 9) (I49 9)   5  Irritable bowel syndrome (564 1) (K58 9)   6  Morbid obesity (278 01) (E66 01)    Current Meds   1  CoQ10 200 MG Oral Capsule; Therapy: (Recorded:03May2017) to Recorded   2  Corlanor 5 MG Oral Tablet; Therapy: (Recorded:03May2017) to Recorded   3  Crestor 5 MG Oral Tablet (Rosuvastatin Calcium); Therapy: (Recorded:03May2017) to Recorded   4  Multi-Day Oral Tablet; Therapy: (Recorded:19May2017) to Recorded   5  Omeprazole 20 MG Oral Capsule Delayed Release; TAKE 1 CAPSULE DAILY EVERY   MORNING BEFORE BREAKFAST; Therapy: 79XQQ0786 to (Evaluate:24Nov2017)  Requested for: 49Opg6543; Last   Rx:64Ukv9455 Ordered   6  Oxycodone-Acetaminophen 5-325 MG Oral Tablet (Percocet); TAKE 1 TABLET EVERY 4   TO 6 HOURS AS NEEDED FOR PAIN;   Therapy: 63EPJ8941 to (Evaluate:31Tsi2557); Last Rx:98Hrt9562 Ordered   7  Sertraline HCl - 50 MG Oral Tablet; Therapy: (Recorded:03May2017) to Recorded   8  Vitamin D 2000 UNIT Oral Capsule; Therapy: (Recorded:19May2017) to Recorded    Allergies    1  Influenza Virus Vaccine Whole   2  Iodinated Contrast Media   3  Penicillins   4   Sulfa Drugs    Signatures   Electronically signed by : ALEENA Hernandez; Aug  1 2017 12:09PM EST                       (Author)

## 2018-01-12 VITALS
RESPIRATION RATE: 14 BRPM | SYSTOLIC BLOOD PRESSURE: 120 MMHG | BODY MASS INDEX: 41.77 KG/M2 | DIASTOLIC BLOOD PRESSURE: 90 MMHG | HEIGHT: 62 IN | TEMPERATURE: 98.6 F | WEIGHT: 227 LBS | HEART RATE: 74 BPM

## 2018-01-12 VITALS
HEIGHT: 62 IN | SYSTOLIC BLOOD PRESSURE: 114 MMHG | TEMPERATURE: 97 F | DIASTOLIC BLOOD PRESSURE: 72 MMHG | HEART RATE: 72 BPM | BODY MASS INDEX: 32.02 KG/M2 | RESPIRATION RATE: 18 BRPM | WEIGHT: 174 LBS

## 2018-01-13 VITALS
RESPIRATION RATE: 18 BRPM | BODY MASS INDEX: 38.55 KG/M2 | WEIGHT: 209.5 LBS | HEART RATE: 76 BPM | TEMPERATURE: 97.2 F | HEIGHT: 62 IN | DIASTOLIC BLOOD PRESSURE: 72 MMHG | SYSTOLIC BLOOD PRESSURE: 112 MMHG

## 2018-01-13 VITALS
TEMPERATURE: 98.2 F | DIASTOLIC BLOOD PRESSURE: 78 MMHG | RESPIRATION RATE: 18 BRPM | HEART RATE: 80 BPM | BODY MASS INDEX: 42.6 KG/M2 | HEIGHT: 62 IN | WEIGHT: 231.5 LBS | SYSTOLIC BLOOD PRESSURE: 122 MMHG

## 2018-01-13 VITALS — HEIGHT: 62 IN | WEIGHT: 232 LBS | BODY MASS INDEX: 42.69 KG/M2

## 2018-01-13 NOTE — RESULT NOTES
Dear Coleen Patel,   Your test results have returned and are listed below:      Discussion/Summary  Your results have some minor abnormalities, but nothing that is concerning  Your platelets, which are part of your complete blood count, are slightly elevated  Your platelets help control your bloods' clotting ability  You can follow up with your primary care physician for further evaluation  Your levels will be rechecked after surgery  There were no other abnormalities  Please keep your regularly scheduled follow-up appointment  If you do not have a follow-up scheduled, please call the office to schedule a follow-up visit  If you have any questions, please don't hesitate to call the office     Sincerely,      Signatures   Electronically signed by : BRIANA YoussefRD; Jun 9 2017  4:07PM EST                       (Author)    Electronically signed by : ALEXIS Velazquez ; Efraín 15 2017  9:58AM EST                       (Validation)

## 2018-01-14 VITALS
SYSTOLIC BLOOD PRESSURE: 118 MMHG | DIASTOLIC BLOOD PRESSURE: 80 MMHG | HEIGHT: 62 IN | TEMPERATURE: 97.6 F | BODY MASS INDEX: 42.69 KG/M2 | RESPIRATION RATE: 18 BRPM | WEIGHT: 232 LBS | HEART RATE: 74 BPM

## 2018-01-15 VITALS — BODY MASS INDEX: 36.1 KG/M2 | WEIGHT: 197.4 LBS

## 2018-01-15 NOTE — PROGRESS NOTES
Discussion/Summary  Discussion Summary:   Patient attended 5 week post op class  Reviewed diet progression, vitamin and mineral recommendations, 30/60 rules, volume of foods, protein supplements, hydration needs, antacid guidelines, recommendation to f/u with pcp concerning med adjustments, exercise guidelines, hair shedding, contraception guidelines, constipation prevention and treatment, alcohol avoidance and recommendations of when to call the office  Patient was also weighed and filled out form for program   Time was left for discussion and to answer questions  Active Problems    1  Asthma (493 90) (J45 909)   2  Benign essential hypertension (401 1) (I10)   3  Hypercholesterolemia (272 0) (E78 00)   4  Intestinal malabsorption following gastrectomy (579 3) (K91 2,Z90 3)   5  Irregular heart beat (427 9) (I49 9)   6  Irritable bowel syndrome (564 1) (K58 9)   7  Morbid obesity (278 01) (E66 01)   8  Status post bariatric surgery (V45 86) (A77 37)    Surgical History    1  History of  Section   2  History of Gastric Surgery For Morbid Obesity Bypass With Myrtle-en-Y   3  History of Oral Surgery Tooth Extraction [place] Tooth   4  History of Sinus Surgery   5  History of Tubal Ligation   6  History of Uterine Myomectomy Laparoscopic Ablation    Family History  Mother    1  Family history of cardiac disorder (V17 49) (Z82 49)  Father    2  Family history of hypertension (V17 49) (Z82 49)   3  Family history of myocardial infarction (V17 3) (Z82 49)    Social History    · Never a smoker   · No illicit drug use   · Social alcohol use (Z78 9)    Current Meds   1  CoQ10 200 MG Oral Capsule; Therapy: (Recorded:79Xya9128) to Recorded   2  Corlanor 5 MG Oral Tablet; Therapy: (Recorded:53Aiv0709) to Recorded   3  Crestor 5 MG Oral Tablet; Therapy: (Recorded:12Wup0209) to Recorded   4  Multi-Day Oral Tablet; Therapy: (Recorded:70Jgp8179) to Recorded   5   Omeprazole 20 MG Oral Capsule Delayed Release; TAKE 1 CAPSULE DAILY EVERY   MORNING BEFORE BREAKFAST; Therapy: 40DPG2613 to (Evaluate:24Nov2017)  Requested for: 88Whi2558; Last   Rx:52Afj0500 Ordered   6  Oxycodone-Acetaminophen 5-325 MG Oral Tablet; TAKE 1 TABLET EVERY 4 TO 6   HOURS AS NEEDED FOR PAIN;   Therapy: 76HHM8292 to (Evaluate:00Nrr6323); Last Rx:40Azb1435 Ordered   7  Sertraline HCl - 50 MG Oral Tablet; Therapy: (Recorded:86Jjh7175) to Recorded   8  Vitamin D 2000 UNIT Oral Capsule; Therapy: (Recorded:46Phf3560) to Recorded    Allergies    1  Influenza Virus Vaccine Whole   2  Iodinated Contrast Media   3  Penicillins   4   Sulfa Drugs    Vitals  Signs   Recorded: 38Kvv4091 11:45AM   Weight: 197 lb 6 4 oz  BMI Calculated: 36 11  BSA Calculated: 1 9    Future Appointments    Date/Time Provider Specialty Site   11/15/2017 11:00 AM William Osullivan, [facility] General Surgery [facility]     Signatures   Electronically signed by : BRIANA MedeirosRD; Sep 12 2017 11:45AM EST                       (Author)    Electronically signed by : ALEXIS Whiting ; Sep 14 2017  4:40PM EST                       (Validation)

## 2018-01-16 NOTE — MISCELLANEOUS
Message  8/10/2017 @ 1230 - post op follow up phone call completed  Pt is sipping liquids, using IS as instructed, reinforced importance of using IS to help prevent pneumonia  Ambulating about home without difficulty  Minimal pain, not using Percocet  Reaffirmed examples of liquid diet over the next week  Pt stated understanding about discharge instructions and medication adjustments  [facility] [id number] [facility]  Follow up appt with surgeon scheduled for next week  Instructed to call with any additional questions or concerns  Active Problems    1  Asthma (493 90) (J45 909)   2  Benign essential hypertension (401 1) (I10)   3  Hypercholesterolemia (272 0) (E78 00)   4  Irregular heart beat (427 9) (I49 9)   5  Irritable bowel syndrome (564 1) (K58 9)   6  Morbid obesity (278 01) (E66 01)    Current Meds   1  CoQ10 200 MG Oral Capsule; Therapy: (Recorded:03May2017) to Recorded   2  Corlanor 5 MG Oral Tablet; Therapy: (Recorded:03May2017) to Recorded   3  Crestor 5 MG Oral Tablet; Therapy: (Recorded:03May2017) to Recorded   4  Multi-Day Oral Tablet; Therapy: (Recorded:19May2017) to Recorded   5  Omeprazole 20 MG Oral Capsule Delayed Release; TAKE 1 CAPSULE DAILY EVERY   MORNING BEFORE BREAKFAST; Therapy: [id number] to (Evaluate:24Nov2017)  Requested for: 04Nwd2223; Last   Rx:[id number] Ordered   6  Oxycodone-Acetaminophen 5-325 MG Oral Tablet; TAKE 1 TABLET EVERY 4 TO 6   HOURS AS NEEDED FOR PAIN;   Therapy: 30XYW9087 to (Evaluate:12Ehi1426); Last Rx:[id number] Ordered   7  Sertraline HCl - 50 MG Oral Tablet; Therapy: (Recorded:03May2017) to Recorded   8  Vitamin D 2000 UNIT Oral Capsule; Therapy: (Recorded:80Win3563) to Recorded    Allergies    1  Influenza Virus Vaccine Whole   2  Iodinated Contrast Media   3  Penicillins   4   Sulfa Drugs    Signatures   Electronically signed by : Bruce Dhillon, ; Aug 10 2017 12:40PM EST                       (Author)

## 2018-01-18 NOTE — MISCELLANEOUS
Message  Patient called to report that she is having incisional pain since her run on Monday  I told her to use a heating pad and rest for a couple of days  She doesn't have any other symptoms but I told her that if she develops a fever or chills to call the office  Active Problems    1  Asthma (493 90) (J45 909)   2  Benign essential hypertension (401 1) (I10)   3  Hypercholesterolemia (272 0) (E78 00)   4  Intestinal malabsorption following gastrectomy (579 3) (K91 2,Z90 3)   5  Irregular heart beat (427 9) (I49 9)   6  Irritable bowel syndrome (564 1) (K58 9)   7  Morbid obesity (278 01) (E66 01)   8  Status post bariatric surgery (V45 86) (Z98 84)    Current Meds   1  CoQ10 200 MG Oral Capsule; Therapy: (Recorded:03May2017) to Recorded   2  Corlanor 5 MG Oral Tablet; Therapy: (Recorded:03May2017) to Recorded   3  Crestor 5 MG Oral Tablet (Rosuvastatin Calcium); Therapy: (Recorded:03May2017) to Recorded   4  Multi-Day Oral Tablet; Therapy: (Recorded:19May2017) to Recorded   5  Omeprazole 20 MG Oral Capsule Delayed Release; TAKE 1 CAPSULE DAILY EVERY   MORNING BEFORE BREAKFAST; Therapy: [id number] to (Evaluate:24Nov2017)  Requested for: 60Aeo7933; Last   Rx:[id number] Ordered   6  Oxycodone-Acetaminophen 5-325 MG Oral Tablet (Percocet); TAKE 1 TABLET EVERY 4   TO 6 HOURS AS NEEDED FOR PAIN;   Therapy: 10YOY2516 to (Evaluate:49Ryz8756); Last Rx:[id number] Ordered   7  Sertraline HCl - 50 MG Oral Tablet; Therapy: (Recorded:03May2017) to Recorded   8  Vitamin D 2000 UNIT Oral Capsule; Therapy: (Recorded:19May2017) to Recorded    Allergies    1  Influenza Virus Vaccine Whole   2  Iodinated Contrast Media   3  Penicillins   4   Sulfa Drugs    Signatures   Electronically signed by : Ellie Iyer, ; Aug 30 2017  8:33AM EST                       (Author)

## 2018-01-18 NOTE — PROGRESS NOTES
History of Present Illness  Bariatric MNT [facility] Screening Preop [facility]:     She was on time  the appointment lasted: 60 minutes  Her surgeon is Dr Cuong Lind  The patient was present at the session and her mother attended the session  The diagnosis/reason for the appointment is: She has Grade III Obesity with a BMI of 42 4  She has the following comorbidities: hypercholesterolemia, hypertension and tachycardia, IBS  Labs: Jany Travis She was reminded to have her labs drawn   She does not need to monitor her glucose  Exercise Frequency:  She exercises walks  Bowel Habits:  She has diarrhea  Relationship to food: She is an emotional eater and eats when she is bored  She knows the difference between being comfortably full and uncomfortably full and knows the difference between being stuffed and comfortably full  She felt/thought they felt her best at 120-130 pounds she last weighed this during her early 25s  She did not complete a food journal 24-Hour Food Recall Breakfast Lean 1 shake from [employer]- includes 20 grams of protein plus branched chain amino acids    (am-  Patient is a 34year old who is here for nutritional evaluation for weight loss surgery  I reviewed comorbidities and medications prior to session  She first recalls having problems with weight gain at the age of 26   She has dieted in the past with variable success but would regain the weight back  (refer to diet history for details)  For her personal pre-operative goals she will: return to food journaling on her phone call and add strength training to her exercise routine  She will complete all 6 lesson plans in her bariatric booklet  She was instructed on the importance of consistent vitamin and mineral intake after her surgery to prevent deficiencies  She currently does not take any vitamins or minerals    Recommended that she take an adult multivitamin/mineral plus 2000 IU of vitamin D3  Reviewed importance of support after surgery and discussed the web forum, Debbie Nick, pep rally and support group  Patient states adequate knowledge of nutrition, exercise and behavior modification required for long term success  Pt  is recommended for surgery and is aware that she will be required to follow the 2 week pre operative liver shrinking diet prior to surgery  Pt also understands that she needs to implement lifestyle changes in preparation for surgery  Patient's mother present and supportive  Recommendations: She was provided contact information for any questions  She is recommended for surgery  She states adequate knowledge of nutrition, exercise, and behavior modification  She will attend a Team Meeting approximately 2-3 weeks prior to surgery  Active Problems    1  Asthma (493 90) (J45 909)   2  Benign essential hypertension (401 1) (I10)   3  Hypercholesterolemia (272 0) (E78 00)   4  Irregular heart beat (427 9) (I49 9)   5  Irritable bowel syndrome (564 1) (K58 9)   6  Morbid obesity (278 01) (E66 01)    Surgical History    1  History of  Section   2  History of Oral Surgery Tooth Extraction [place] Tooth   3  History of Sinus Surgery   4  History of Tubal Ligation   5  History of Uterine Myomectomy Laparoscopic Ablation    Family History  Mother    1  Family history of cardiac disorder (V17 49) (Z82 49)  Father    2  Family history of hypertension (V17 49) (Z82 49)   3  Family history of myocardial infarction (V17 3) (Z82 49)    Social History    · Never a smoker   · No illicit drug use   · Social alcohol use (Z78 9)    Current Meds   1  CoQ10 200 MG Oral Capsule; Therapy: (Recorded:11Zza1171) to Recorded   2  Corlanor 5 MG Oral Tablet; Therapy: (Recorded:88Emi6734) to Recorded   3  Crestor 5 MG Oral Tablet (Rosuvastatin Calcium); Therapy: (Recorded:65Apb0812) to Recorded   4  Sertraline HCl - 50 MG Oral Tablet; Therapy: (Recorded:42Exi9960) to Recorded    Allergies    1  Influenza Virus Vaccine Whole   2   Iodinated Contrast Media   3  Penicillins   4  Sulfa Drugs    Future Appointments    Date/Time Provider Specialty Site   05/19/2017 11:00 AM ALEXIS Gomez   General Surgery [facility]     Signatures   Electronically signed by : ALEXIS Feliz ; May  4 2017  9:04AM EST                       (Validation)

## 2018-02-16 RX ORDER — MULTIVIT-MIN/IRON/FOLIC ACID/K 18-600-40
CAPSULE ORAL
COMMUNITY
End: 2019-08-20

## 2018-02-16 RX ORDER — ROSUVASTATIN CALCIUM 5 MG/1
TABLET, COATED ORAL
COMMUNITY
Start: 2016-10-18 | End: 2019-08-20

## 2018-02-16 RX ORDER — OMEPRAZOLE 20 MG/1
1 CAPSULE, DELAYED RELEASE ORAL DAILY
COMMUNITY
Start: 2017-07-27 | End: 2018-02-28 | Stop reason: SDUPTHER

## 2018-02-20 ENCOUNTER — OFFICE VISIT (OUTPATIENT)
Dept: BARIATRICS | Facility: CLINIC | Age: 33
End: 2018-02-20
Payer: COMMERCIAL

## 2018-02-20 VITALS
HEART RATE: 69 BPM | HEIGHT: 62 IN | DIASTOLIC BLOOD PRESSURE: 86 MMHG | TEMPERATURE: 98.2 F | WEIGHT: 147 LBS | SYSTOLIC BLOOD PRESSURE: 130 MMHG | BODY MASS INDEX: 27.05 KG/M2

## 2018-02-20 DIAGNOSIS — Z98.84 BARIATRIC SURGERY STATUS: Primary | ICD-10-CM

## 2018-02-20 DIAGNOSIS — K91.2 POSTSURGICAL MALABSORPTION: ICD-10-CM

## 2018-02-20 DIAGNOSIS — E78.00 PURE HYPERCHOLESTEROLEMIA: ICD-10-CM

## 2018-02-20 PROBLEM — E66.01 MORBID OBESITY (HCC): Status: RESOLVED | Noted: 2017-08-07 | Resolved: 2018-02-20

## 2018-02-20 PROBLEM — I10 BENIGN ESSENTIAL HYPERTENSION: Status: ACTIVE | Noted: 2017-05-03

## 2018-02-20 PROCEDURE — 99214 OFFICE O/P EST MOD 30 MIN: CPT | Performed by: PHYSICIAN ASSISTANT

## 2018-02-20 RX ORDER — DOXYCYCLINE HYCLATE 75 MG/1
TABLET, DELAYED RELEASE ORAL
COMMUNITY
Start: 2017-12-24 | End: 2019-08-20

## 2018-02-20 NOTE — ASSESSMENT & PLAN NOTE
-s/p Myrtle-En-Y Gastric Bypass with Dr Maria Isabel Guerra on 8/7/2017  Overall doing Well  Initial:232 lb  Current: 147lb  EWL: 88%  Brendan:147 lb  Current BMI is Body mass index is 26 89 kg/m²      Tolerating a regular diet-yes  Eating at least 60 grams of protein per day-yes  Following 30/60 minute rule with liquids-30/45 minutes with is acceptable for now  Drinking at least 64 ounces of fluid per day-yes  Drinking carbonated beverages-no  Sufficient exercise-yes  Using NSAIDs regularly-no  Using nicotine-no  Using alcohol-no

## 2018-02-20 NOTE — ASSESSMENT & PLAN NOTE
-At risk for malabsorption of vitamins/minerals secondary to malabsorption and restriction of intake from their procedure  -Currently taking adequate postop bariatric surgery vitamin supplementation-yes  -Last set of bariatric labs completed on 2/14/208 and are within acceptable limits   -Next set of bariatric labs ordered for approximately 6 months

## 2018-02-20 NOTE — ASSESSMENT & PLAN NOTE
Total cholesterol increased but she remains with good hdl of 47  LDL has increased to 146-she had required statin in the past and will be reviewed with her PCP/defer to PCP  Advised if she needs medication at some point it would be related to hereditary factors as her weight loss is above average  Will not recheck this with her routine annual labs in July 2018

## 2018-02-20 NOTE — PATIENT INSTRUCTIONS
Follow-up in 6 months  Follow diet as discussed  Get lab work done prior to next office visit  It is recommended to check with your insurance BEFORE getting labs done to make sure they are covered by your policy  Make sure to HOLD any multivitamins that may contain biotin and any biotin supplements FOR 5 DAYS before any labs since it can affect the results  Follow vitamin  and mineral recommendations as reviewed with you  Exercise as tolerated    Call our office if you have any problems with abdominal pain especially associated with fever, chills, nausea, vomiting or any other concerns  All  Post-bariatric surgery patients should be aware that very small quantities of any alcohol  can cause impairment and it is very possible not to feel the effect  The effect can be in the system for several hours  It is also a stomach irritant  It is advised to AVOID alcohol, Nonsteroidal antiinflammatory drugs (NSAIDS) and nicotine of all forms   Any of these can cause stomach irritation/pain

## 2018-02-20 NOTE — ASSESSMENT & PLAN NOTE
She has had h/o tachycardia and elevated bp but she came off her antihypertensive medication in the fall BP and pulse within acceptable range-off medication

## 2018-02-20 NOTE — PROGRESS NOTES
Assessment/Plan:    Hyperlipidemia  Total cholesterol increased but she remains with good hdl of 47  LDL has increased to 146-she had required statin in the past and will be reviewed with her PCP/defer to PCP  Advised if she needs medication at some point it would be related to hereditary factors as her weight loss is above average  Will not recheck this with her routine annual labs in July 2018  Bariatric surgery status  -s/p Myrtle-En-Y Gastric Bypass with Dr Ning Saul on 8/7/2017  Overall doing Well  Initial:232 lb  Current: 147lb  EWL: 88%  Brendan:147 lb  Current BMI is Body mass index is 26 89 kg/m²  Tolerating a regular diet-yes  Eating at least 60 grams of protein per day-yes  Following 30/60 minute rule with liquids-30/45 minutes with is acceptable for now  Drinking at least 64 ounces of fluid per day-yes  Drinking carbonated beverages-no  Sufficient exercise-yes  Using NSAIDs regularly-no  Using nicotine-no  Using alcohol-no    Postsurgical malabsorption  -At risk for malabsorption of vitamins/minerals secondary to malabsorption and restriction of intake from their procedure  -Currently taking adequate postop bariatric surgery vitamin supplementation-yes  -Last set of bariatric labs completed on 2/14/208 and are within acceptable limits   -Next set of bariatric labs ordered for approximately 6 months      Benign essential hypertension  She has had h/o tachycardia and elevated bp but she came off her antihypertensive medication in the fall BP and pulse within acceptable range-off medication       Diagnoses and all orders for this visit:    Bariatric surgery status  -     Comprehensive metabolic panel; Future  -     CBC and differential; Future  -     Vitamin B12; Future  -     Vitamin A; Future  -     PTH, intact; Future  -     Vitamin B1, whole blood; Future  -     Vitamin D 25 hydroxy; Future  -     Folate; Future  -     Ferritin; Future  -     Iron Saturation %;  Future    Postsurgical malabsorption  -     Comprehensive metabolic panel; Future  -     CBC and differential; Future  -     Vitamin B12; Future  -     Vitamin A; Future  -     PTH, intact; Future  -     Vitamin B1, whole blood; Future  -     Vitamin D 25 hydroxy; Future  -     Folate; Future  -     Ferritin; Future  -     Iron Saturation %; Future    Pure hypercholesterolemia    Other orders  -     omeprazole (PriLOSEC) 20 mg delayed release capsule; Take 1 capsule by mouth Daily  -     Cholecalciferol (VITAMIN D) 2000 units CAPS; Take by mouth  -     rosuvastatin (CRESTOR) 5 mg tablet; TAKE 1 TABLET NIGHTLY  -     doxycycline (DORYX) 75 MG EC tablet;           Subjective:      Patient ID: Heidi Shaffer is a 28 y o  female  She is here for routine folow-up  She is tolerating a regular diet  Taking appropriate supplements  Exercising regularly  She has no complaints today  The following portions of the patient's history were reviewed and updated as appropriate: allergies, current medications, past family history, past medical history, past social history, past surgical history and problem list     Review of Systems   Constitutional: Negative for chills and fever  Unexpected weight change: planned weight loss  Respiratory: Negative for shortness of breath and wheezing  Cardiovascular: Negative for chest pain and palpitations  Gastrointestinal: Negative for abdominal pain, constipation, diarrhea, nausea and vomiting  Psychiatric/Behavioral: Suicidal ideas: no complait of anxiety or depression  Objective:      /86   Pulse 69   Temp 98 2 °F (36 8 °C)   Ht 5' 2" (1 575 m)   Wt 66 7 kg (147 lb)   BMI 26 89 kg/m²          Physical Exam   Constitutional: She is oriented to person, place, and time  She appears well-developed and well-nourished  HENT:   Mouth/Throat: Oropharynx is clear and moist    Eyes: Conjunctivae are normal  No scleral icterus     Cardiovascular: Normal rate, regular rhythm and normal heart sounds  Pulmonary/Chest: Effort normal and breath sounds normal    Abdominal: Soft  There is no tenderness  No incisional hernias appreciated   Musculoskeletal:   Normal gait   Neurological: She is alert and oriented to person, place, and time  Psychiatric: She has a normal mood and affect  Her behavior is normal  Judgment and thought content normal      GOALS: Maintain weight +/- Continued weight loss  Of a few pounds with good nutrition intakes    Normal vitamin and mineral levels  Exercise as tolerated    BARRIERS: none identified

## 2018-02-28 DIAGNOSIS — Z98.84 BARIATRIC SURGERY STATUS: Primary | ICD-10-CM

## 2018-02-28 RX ORDER — OMEPRAZOLE 20 MG/1
CAPSULE, DELAYED RELEASE ORAL
Qty: 30 CAPSULE | Refills: 2 | Status: SHIPPED | OUTPATIENT
Start: 2018-02-28 | End: 2019-08-20 | Stop reason: ALTCHOICE

## 2018-05-17 ENCOUNTER — DOCUMENTATION (OUTPATIENT)
Dept: BARIATRICS | Facility: CLINIC | Age: 33
End: 2018-05-17

## 2018-05-17 NOTE — PROGRESS NOTES
Weight Management Nutrition Class     Diagnosis: Obesity    Bariatric Surgeon: Dr Isreal Lagunas    Surgery: Gastric Bypass Laparoscopic    Class: 9 month post op note    Topics discussed today include:     fluid goals post op, protein goals post op, constipation, chew food well, exercise, avoidance of alcohol, PPI use, diet progression, hypoglycemia, dumping syndrome, protein supplems, vitamin/mineral supplements, calcium supplements and additional vitamin B12    Patient was able to verbalize basic diet (protein, fluid, vitamin and mineral) recommendations and possible nutrition-related complications  Yes     Attended 9 month follow up meeting  Addressed both behavioral and dietary issues  Group discussion included the impact of tobacco use, alcohol use, psychiatric medications, relationships, body image and self esteem issues as it pertains to the weight loss surgery patient  During group discussion, reviewed vitamin recommendations, protein recommendations, portion sizes, balancing diet to include healthy carbohydrates, importance of exercise, and the bariatric rules for success   Overall pleased with weight loss and improved quality of life

## 2018-08-20 ENCOUNTER — OFFICE VISIT (OUTPATIENT)
Dept: BARIATRICS | Facility: CLINIC | Age: 33
End: 2018-08-20
Payer: COMMERCIAL

## 2018-08-20 VITALS
TEMPERATURE: 97.4 F | BODY MASS INDEX: 23.83 KG/M2 | WEIGHT: 129.5 LBS | HEART RATE: 88 BPM | DIASTOLIC BLOOD PRESSURE: 82 MMHG | HEIGHT: 62 IN | SYSTOLIC BLOOD PRESSURE: 116 MMHG

## 2018-08-20 DIAGNOSIS — K91.2 POSTSURGICAL MALABSORPTION: ICD-10-CM

## 2018-08-20 DIAGNOSIS — E78.5 HYPERLIPIDEMIA, UNSPECIFIED HYPERLIPIDEMIA TYPE: ICD-10-CM

## 2018-08-20 DIAGNOSIS — Z98.84 BARIATRIC SURGERY STATUS: Primary | ICD-10-CM

## 2018-08-20 PROBLEM — I10 BENIGN ESSENTIAL HYPERTENSION: Status: RESOLVED | Noted: 2017-05-03 | Resolved: 2018-08-20

## 2018-08-20 PROCEDURE — 99213 OFFICE O/P EST LOW 20 MIN: CPT | Performed by: PHYSICIAN ASSISTANT

## 2018-08-20 RX ORDER — CLINDAMYCIN PHOSPHATE 10 MG/G
GEL TOPICAL
COMMUNITY
Start: 2018-05-14 | End: 2019-08-20

## 2018-08-20 NOTE — ASSESSMENT & PLAN NOTE
-s/p Myrtle-En-Y Gastric Bypass with Dr Casandra Sousa on 8/7/2017  Overall doing Well  Notes weight is now within a five pound range up and down-advised goal to maintain within this range    Initial: 232 lb  Current: 129 5 lb  EWL: 107%  Brendan: Current  Current BMI is Body mass index is 23 69 kg/m²      Tolerating a regular diet-yes  Eating at least 60 grams of protein per day-yes  Following 30/60 minute rule with liquids-follows 30/45-60 minutes after which is ok  Drinking at least 64 ounces of fluid per day-yes  Drinking carbonated beverages-no  Sufficient exercise-yes  Using NSAIDs regularly-no  Using nicotine-no  Using alcohol-has tried 4 ounces of wine-advised on effect after gastric surgery and encouraged to avoid

## 2018-08-20 NOTE — ASSESSMENT & PLAN NOTE
Malabsorption- patient is at risk for malabsorption of vitamins/minerals secondary to malabsorption from her procedure and restriction of intakes  Reviewed current supplements and advised on same  She is taking one procare with 18 mg of iron  And 2 calcium citrate with D and is getting good dairy products in her diet  August labs reviewed via care everywhere and are within acceptable range

## 2018-08-20 NOTE — PATIENT INSTRUCTIONS
Follow-up in one year  We kindly ask that you arrive 15 minutes before your scheduled appointment time with your provider to allow you to be roomed, have your vital signs checked and your chart updated by our staff  We thank you for your patience at your visit  Follow diet as discussed  Follow  vitamin and mineral recommendations as reviewed with you  Exercise as tolerated    If you have gotten a lab slip at this visit, please note that most labs are FASTING-but you need to drink water the night before and the morning before your labs are done  It is HIGHLY RECOMMENDED that you check with your insurance to make sure all the labs ordered are covered by your individual insurance policy  This is especially important if you also get labs done by other providers outside of [facility]  You want to avoid having duplicate labs done  Note you will be given a lab slip AFTER  your annual visit next year to check your vitamin and mineral levels  You will not need to make a second appointment after the labs are received/reviewed  You will receive a letter/and or phone call with your results  Most labs do NOT honor a lab slip dated one year in advance now  Call our office if he have any problems with abdominal pain especially if associated with fever, chills, nausea, vomiting or any other concerns  All  Post-bariatric surgery patients should be aware that very small quantities of any alcohol  can cause impairment and it is very possible not to feel the effect  The effect can be in the system for several hours  It is also a stomach irritant  It is advised to AVOID alcohol, Nonsteroidal antiinflammatory drugs (NSAIDS) and nicotine of all forms   Any of these can cause stomach irritation/pain

## 2018-08-20 NOTE — PROGRESS NOTES
Assessment/Plan:    Bariatric surgery status  -s/p Myrtle-En-Y Gastric Bypass with Dr Pb Cronin on 8/7/2017  Overall doing Well  Notes weight is now within a five pound range up and down-advised goal to maintain within this range    Initial: 232 lb  Current: 129 5 lb  EWL: 107%  Brendan: Current  Current BMI is Body mass index is 23 69 kg/m²  Tolerating a regular diet-yes  Eating at least 60 grams of protein per day-yes  Following 30/60 minute rule with liquids-follows 30/45-60 minutes after which is ok  Drinking at least 64 ounces of fluid per day-yes  Drinking carbonated beverages-no  Sufficient exercise-yes  Using NSAIDs regularly-no  Using nicotine-no  Using alcohol-has tried 4 ounces of wine-advised on effect after gastric surgery and encouraged to avoid      Postsurgical malabsorption  Malabsorption- patient is at risk for malabsorption of vitamins/minerals secondary to malabsorption from her procedure and restriction of intakes  Reviewed current supplements and advised on same  She is taking one procare with 18 mg of iron  And 2 calcium citrate with D and is getting good dairy products in her diet  August labs reviewed via care everywhere and are within acceptable range    Hyperlipidemia  She has been off her statin since her bariatric surgery and lipids are within normal range-off medication-her excellent weight loss had likely helped       Diagnoses and all orders for this visit:    Bariatric surgery status    Postsurgical malabsorption    Hyperlipidemia, unspecified hyperlipidemia type    Other orders  -     clindamycin (CLINDAGEL) 1 % gel;           Subjective:      Patient ID: Radha Obregon is a 28 y o  female  She is tolerating  A regular diet   Taking bariatric supplements and is exercising regularly She has no complaints today        The following portions of the patient's history were reviewed and updated as appropriate: allergies, current medications, past family history, past medical history, past social history, past surgical history and problem list     Review of Systems   Constitutional: Negative for chills and fever  Unexpected weight change: planned weight loss  Respiratory: Negative for shortness of breath and wheezing  Cardiovascular: Negative for chest pain and palpitations  Gastrointestinal: Negative for abdominal pain, constipation, diarrhea, nausea and vomiting  Psychiatric/Behavioral: Suicidal ideas: no complait of anxiety or depression  Objective:      /82 (BP Location: Left arm, Patient Position: Sitting, Cuff Size: Adult)   Pulse 88   Temp (!) 97 4 °F (36 3 °C) (Tympanic)   Ht 5' 2" (1 575 m)   Wt 58 7 kg (129 lb 8 oz)   BMI 23 69 kg/m²          Physical Exam   Constitutional: She is oriented to person, place, and time  She appears well-developed and well-nourished  HENT:   Mouth/Throat: Oropharynx is clear and moist    Eyes: Conjunctivae are normal  No scleral icterus  Cardiovascular: Normal rate, regular rhythm and normal heart sounds  Pulmonary/Chest: Effort normal and breath sounds normal    Abdominal: Soft  There is no tenderness  No incisional hernias appreciated   Musculoskeletal:   Normal gait   Neurological: She is alert and oriented to person, place, and time  Psychiatric: She has a normal mood and affect  Her behavior is normal  Judgment and thought content normal    Nursing note and vitals reviewed        Goals: Maintain weight loss with good nutrition intakes  Normal vitamin and mineral levels  Exercise as tolerated

## 2018-08-20 NOTE — ASSESSMENT & PLAN NOTE
She has been off her statin since her bariatric surgery and lipids are within normal range-off medication-her excellent weight loss had likely helped

## 2019-08-20 ENCOUNTER — OFFICE VISIT (OUTPATIENT)
Dept: BARIATRICS | Facility: CLINIC | Age: 34
End: 2019-08-20
Payer: COMMERCIAL

## 2019-08-20 VITALS
HEIGHT: 62 IN | SYSTOLIC BLOOD PRESSURE: 114 MMHG | TEMPERATURE: 97.4 F | WEIGHT: 135 LBS | BODY MASS INDEX: 24.84 KG/M2 | DIASTOLIC BLOOD PRESSURE: 66 MMHG | HEART RATE: 106 BPM

## 2019-08-20 DIAGNOSIS — K91.2 POSTSURGICAL MALABSORPTION: ICD-10-CM

## 2019-08-20 DIAGNOSIS — Z98.84 BARIATRIC SURGERY STATUS: Primary | ICD-10-CM

## 2019-08-20 DIAGNOSIS — R42 LIGHT HEADEDNESS: ICD-10-CM

## 2019-08-20 PROBLEM — R11.2 PONV (POSTOPERATIVE NAUSEA AND VOMITING): Status: RESOLVED | Noted: 2019-08-20 | Resolved: 2019-08-20

## 2019-08-20 PROBLEM — Z98.890 PONV (POSTOPERATIVE NAUSEA AND VOMITING): Status: RESOLVED | Noted: 2019-08-20 | Resolved: 2019-08-20

## 2019-08-20 PROCEDURE — 99213 OFFICE O/P EST LOW 20 MIN: CPT | Performed by: PHYSICIAN ASSISTANT

## 2019-08-20 NOTE — ASSESSMENT & PLAN NOTE
-s/p Myrtle-En-Y Gastric Bypass with Dr Clarke Hicks on 8/7/2017  Overall doing Well  She gained 5 1/2 lb from last year -which is good as she was lower ideal body weight range then    Initial:232 lb  Current:135 lb  EWL:101%  Brendan:129  5l bl-one year post-op  Current BMI is Body mass index is 24 69 kg/m²      Tolerating a regular diet-yes  Eating at least 60 grams of protein per day-yes  Following 30/60 minute rule with liquids-30/45 now-which is ok for her and advised if she needed 30/30 that would be good  Drinking at least 64 ounces of fluid per day-yes  Drinking carbonated beverages-no  Sufficient exercise-yes/ a combination of cardio and weight lifting  Using NSAIDs regularly-no  Using nicotine-no  Using alcohol-yes/once every 3-4 months-one glass of wine/advised on effect after gastric surgery and encouraged to avoid

## 2019-08-20 NOTE — ASSESSMENT & PLAN NOTE
She complains of intermittent lightheadedness /vertigo from changing positions-particularly squatting to standing  I offered to check orthostatic bp readings today-but she declined  She has increased her fluid intakes and added salt to diet but still has this intermittently-advised to try to move slower with changing positions but advised her to follow-up with PCP now as this may need further evaluation  Of note when vitals checked by MA-pulse was 106-recheck was 84 at the end of the visit   She had no complaints in the office today

## 2019-08-20 NOTE — ASSESSMENT & PLAN NOTE
Malabsorption- patient is at risk for malabsorption of vitamins/minerals secondary to malabsorption from her procedure and restriction of intakes  Reviewed current supplements and advised on same    One procare with 18 mg of iron  And taking 500 mg calcium citrate with D-2 per day  And takes tums prn with certain foods like chili/red sauces/-but otherwise doesn't need it    She is due for routine labs now-ordered same

## 2019-08-20 NOTE — PROGRESS NOTES
Assessment/Plan:    Bariatric surgery status  -s/p Myrtle-En-Y Gastric Bypass with Dr Evan Hercules on 8/7/2017  Overall doing Well  She gained 5 1/2 lb from last year -which is good as she was lower ideal body weight range then    Initial:232 lb  Current:135 lb  EWL:101%  Brendan:129  5l bl-one year post-op  Current BMI is Body mass index is 24 69 kg/m²  Tolerating a regular diet-yes  Eating at least 60 grams of protein per day-yes  Following 30/60 minute rule with liquids-30/45 now-which is ok for her and advised if she needed 30/30 that would be good  Drinking at least 64 ounces of fluid per day-yes  Drinking carbonated beverages-no  Sufficient exercise-yes/ a combination of cardio and weight lifting  Using NSAIDs regularly-no  Using nicotine-no  Using alcohol-yes/once every 3-4 months-one glass of wine/advised on effect after gastric surgery and encouraged to avoid      Postsurgical malabsorption  Malabsorption- patient is at risk for malabsorption of vitamins/minerals secondary to malabsorption from her procedure and restriction of intakes  Reviewed current supplements and advised on same    One procare with 18 mg of iron  And taking 500 mg calcium citrate with D-2 per day  And takes tums prn with certain foods like chili/red sauces/-but otherwise doesn't need it    She is due for routine labs now-ordered same    Light headedness  She complains of intermittent lightheadedness /vertigo from changing positions-particularly squatting to standing  I offered to check orthostatic bp readings today-but she declined  She has increased her fluid intakes and added salt to diet but still has this intermittently-advised to try to move slower with changing positions but advised her to follow-up with PCP now as this may need further evaluation  Of note when vitals checked by MA-pulse was 106-recheck was 84 at the end of the visit   She had no complaints in the office today       Diagnoses and all orders for this visit:    Bariatric surgery status  -     Copper Level; Future  -     Ferritin; Future  -     Folate; Future  -     Comprehensive metabolic panel; Future  -     Iron Saturation %; Future  -     PTH, intact; Future  -     Vitamin A; Future  -     Vitamin B1, whole blood; Future  -     Vitamin B12; Future  -     Vitamin D 25 hydroxy; Future  -     Zinc; Future    Postsurgical malabsorption  -     Copper Level; Future  -     Ferritin; Future  -     Folate; Future  -     Comprehensive metabolic panel; Future  -     Iron Saturation %; Future  -     PTH, intact; Future  -     Vitamin A; Future  -     Vitamin B1, whole blood; Future  -     Vitamin B12; Future  -     Vitamin D 25 hydroxy; Future  -     Zinc; Future    Light headedness          Subjective:      Patient ID: Amisha Good is a 27 y o  female  She is here in routine follow-up  She is tolerating a regular diet  She notes occasional stomach upset with tomato sauce and takes 2 tums with relief-otherwise no regular heart burn or issues with her diet  She is exercising regularly with both cardio and weights  Weight is up a few pounds from last year-within ideal body weight range  She notes she tends toward positional vertigo/lightheadedness with going from squatting to standing at times  Has increased her fluid and salt intakes but still has this intermittently-has not had this evaluated  She did not want orthostatic blood pressure readings done today  She otherwise feels well  The following portions of the patient's history were reviewed and updated as appropriate: allergies, current medications, past family history, past medical history, past social history, past surgical history and problem list     Review of Systems   Constitutional: Negative for chills and fever  Unexpected weight change: planned weight loss  Respiratory: Negative for shortness of breath and wheezing  Cardiovascular: Negative for chest pain and palpitations  Gastrointestinal: Negative for abdominal pain, constipation, diarrhea, nausea and vomiting  Neurological: Positive for light-headedness  Notes intermittent lightheadedness /vertigo with changing positions   Psychiatric/Behavioral: Suicidal ideas: no complait of anxiety or depression  Objective:      /66   Pulse (!) 106   Temp (!) 97 4 °F (36 3 °C) (Tympanic)   Ht 5' 2" (1 575 m)   Wt 61 2 kg (135 lb)   BMI 24 69 kg/m²     PULSE RECHECKED at the end of the visit and was 84     Physical Exam   Constitutional: She is oriented to person, place, and time  She appears well-developed and well-nourished  Thin appearing   HENT:   Mouth/Throat: Oropharynx is clear and moist    Eyes: Conjunctivae are normal  No scleral icterus  Cardiovascular: Normal rate and regular rhythm  Pulmonary/Chest: Effort normal and breath sounds normal    Abdominal: Soft  There is no tenderness  No incisional hernias appreciated   Musculoskeletal:   Normal gait   Neurological: She is alert and oriented to person, place, and time  Skin: Skin is warm and dry  Psychiatric: She has a normal mood and affect  Her behavior is normal  Judgment and thought content normal    Nursing note and vitals reviewed        Goals:   Maintain weight loss with good nutrition intakes  Normal vitamin and mineral levels  Exercise as tolerated

## 2019-08-20 NOTE — PATIENT INSTRUCTIONS
Follow-up in one year  We kindly ask that you arrive 15 minutes before your scheduled appointment time with your provider to allow you to be roomed, have your vital signs checked and your chart updated by our staff  We thank you for your patience at your visit  Follow diet as discussed  Follow  vitamin and mineral recommendations as reviewed with you  Bariatric vitamins are highly recommended  Vitamins are important for a life-time  Low levels can lead to deficiency which can lead to other medical problems  Exercise as tolerated    Recommend that you follow-up with Primary care provider with your vertigo    If you have gotten a lab slip at this visit, please note that most labs are FASTING-but you need to drink water the night before and the morning before your labs are done  It is HIGHLY RECOMMENDED that you check with your insurance to make sure all the labs ordered are covered by your individual insurance policy  This is especially important if you also get labs done by other providers outside of [facility]  You want to avoid having duplicate labs done  Note you will be given a lab slip AFTER  your annual visit next year to check your vitamin and mineral levels  You will not need to make a second appointment after the labs are received/reviewed  You will receive a letter/and or phone call with your results  Most labs do NOT honor a lab slip dated one year in advance now  Call our office if he have any problems with abdominal pain especially if associated with fever, chills, nausea, vomiting or any other concerns  All  Post-bariatric surgery patients should be aware that very small quantities of any alcohol  can cause impairment and it is very possible not to feel the effect  The effect can be in the system for several hours  It is also a stomach irritant  It is advised to AVOID alcohol, Nonsteroidal antiinflammatory drugs (NSAIDS) and nicotine of all forms    Any of these can cause stomach irritation/pain      Recommend that you follow-up with your primary care provider for your vertigo

## 2020-08-20 ENCOUNTER — OFFICE VISIT (OUTPATIENT)
Dept: BARIATRICS | Facility: CLINIC | Age: 35
End: 2020-08-20
Payer: COMMERCIAL

## 2020-08-20 VITALS
HEIGHT: 62 IN | BODY MASS INDEX: 25.21 KG/M2 | TEMPERATURE: 98.4 F | WEIGHT: 137 LBS | DIASTOLIC BLOOD PRESSURE: 72 MMHG | SYSTOLIC BLOOD PRESSURE: 118 MMHG | HEART RATE: 98 BPM

## 2020-08-20 DIAGNOSIS — K91.2 POSTSURGICAL MALABSORPTION: ICD-10-CM

## 2020-08-20 DIAGNOSIS — E55.9 VITAMIN D INSUFFICIENCY: ICD-10-CM

## 2020-08-20 DIAGNOSIS — T81.89XA INCISIONAL IRRITATION, INITIAL ENCOUNTER: Primary | ICD-10-CM

## 2020-08-20 DIAGNOSIS — Z98.84 BARIATRIC SURGERY STATUS: ICD-10-CM

## 2020-08-20 DIAGNOSIS — Z48.815 ENCOUNTER FOR SURGICAL AFTERCARE FOLLOWING SURGERY OF DIGESTIVE SYSTEM: ICD-10-CM

## 2020-08-20 DIAGNOSIS — E66.3 OVERWEIGHT: ICD-10-CM

## 2020-08-20 PROCEDURE — 99214 OFFICE O/P EST MOD 30 MIN: CPT | Performed by: PHYSICIAN ASSISTANT

## 2020-08-20 RX ORDER — DOXYCYCLINE 100 MG/1
100 TABLET ORAL
COMMUNITY
Start: 2020-04-09

## 2020-08-20 NOTE — PROGRESS NOTES
Assessment/Plan:    Incisional irritation  She is complaining of intermittent incisional irritation at both her upper right and lower right incision sites once a month or two-notes no pain but itches for around 20 minutes at a time  Reports the incision line stays white with some redness around the periphery- "like a hive" which resolves on its own  No associated fever,chills, nausea or vomiting  No factors that make it worse  Not associated with meals  On exam today-incision sites are well-healed; no erythema, no irritation  I did not appreciate any defect to the area  Advised patient that she should continue use of 1% hydrocortizone cream prn  Reassured her that this is less concerning since she is not experiencing pain  Advised on when to seek more immediate medical attention  If it continues to bother her in the future we can consider CT of abdomen to further evaluate  She is agreeable to conservative approach for now and will let me know if she is having problems with the area  After the visit I did review with Dr Marciano Pelaez above  Encounter for surgical aftercare following surgery of digestive system  -s/p Myrtle-En-Y Gastric Bypass with Dr Uri Cedeno on 8/7/2017  Overall doing Well  Initial:232 lb  Current:137 lb  EWL: 99% which remains above average weight loss  Brendan:129 5 lb-one year post-op  Current BMI is Body mass index is 25 06 kg/m²      Tolerating a regular diet-yes  Eating at least 60 grams of protein per day-yes  Following 30/60 minute rule with liquids-yes  Drinking at least 64 ounces of fluid per day-yes  Drinking carbonated beverages-no  Sufficient exercise-yes  Using NSAIDs regularly-no  Using nicotine-no  Using alcohol-yes a glass of wine monthly-advised on effect after her surgery and encouraged to abstain      Postsurgical malabsorption  Malabsorption- patient is at risk for malabsorption of vitamins/minerals secondary to malabsorption from her procedure and restriction of intakes  Reviewed current supplements and advised on same  She is taking one procare health mvi with 18 mg of iron and 2-500 mg calcium citrate with D  She is due for her routine labs-ordered same    Vitamin D insufficiency  She was low in vitamin D on prior labs-did not add extra vitamin D-advised she will need at least 2000 IU extra per day-expect levels to be low but this will be rechecked with her routine labs now    Overweight  Weight stable within a couple pounds of last year-current bmi of 25 1-she is content with current weight-appears physically fit on exam       Diagnoses and all orders for this visit:    Incisional irritation, initial encounter    Overweight  -     CBC and Platelet; Future  -     Comprehensive metabolic panel; Future  -     Ferritin; Future  -     Folate; Future  -     Iron Saturation %; Future  -     PTH, intact; Future  -     Vitamin A; Future  -     Vitamin B1, whole blood; Future  -     Vitamin B12; Future  -     Vitamin D 25 hydroxy; Future  -     Zinc; Future    Encounter for surgical aftercare following surgery of digestive system  -     CBC and Platelet; Future  -     Comprehensive metabolic panel; Future  -     Ferritin; Future  -     Folate; Future  -     Iron Saturation %; Future  -     PTH, intact; Future  -     Vitamin A; Future  -     Vitamin B1, whole blood; Future  -     Vitamin B12; Future  -     Vitamin D 25 hydroxy; Future  -     Zinc; Future    Postsurgical malabsorption  -     CBC and Platelet; Future  -     Comprehensive metabolic panel; Future  -     Ferritin; Future  -     Folate; Future  -     Iron Saturation %; Future  -     PTH, intact; Future  -     Vitamin A; Future  -     Vitamin B1, whole blood; Future  -     Vitamin B12; Future  -     Vitamin D 25 hydroxy; Future  -     Zinc; Future    Vitamin D insufficiency  -     CBC and Platelet; Future  -     Comprehensive metabolic panel; Future  -     Ferritin; Future  -     Folate;  Future  -     Iron Saturation %; Future  -     PTH, intact; Future  -     Vitamin A; Future  -     Vitamin B1, whole blood; Future  -     Vitamin B12; Future  -     Vitamin D 25 hydroxy; Future  -     Zinc; Future    Bariatric surgery status    Other orders  -     doxycycline (ADOXA) 100 MG tablet; Take 100 mg by mouth          Subjective:      Patient ID: Nina Armstrong is a 38 y o  female  38year old female status post laparoscopic brian-en-y gastric bypass surgery by Dr Nitin Vargas 8/7/2017  She is here in routine annual follow-up to assess diet and weight and vitamin/mineral status  She is tolerating a regular diet  She is taking bariatric supplements and is exercising regularly  She notes that her 2 right sided incisions-upper and lower intermittently have some redness around the periphery and itch-notes this is not associated with pain or bulge  Happens once a month or so  Not associated with meals    She uses hydrocortizone cream to the area and it lasts up to 20 minutes at a time and resolves  No associated fever,chills, nausea or vomiting  The following portions of the patient's history were reviewed and updated as appropriate: allergies, current medications, past family history, past medical history, past social history, past surgical history and problem list     Review of Systems   Constitutional: Negative for chills, fever and unexpected weight change (weight is stable)  Respiratory: Negative for shortness of breath and wheezing  Cardiovascular: Negative for chest pain and palpitations  Gastrointestinal: Negative for abdominal pain, constipation, diarrhea, nausea and vomiting  Psychiatric/Behavioral: Suicidal ideas: no complait of anxiety or depression  Objective:      /72 (BP Location: Left arm, Patient Position: Sitting)   Pulse 98   Temp 98 4 °F (36 9 °C)   Ht 5' 2" (1 575 m)   Wt 62 1 kg (137 lb)   BMI 25 06 kg/m²          Physical Exam  Vitals signs and nursing note reviewed  Constitutional:       Appearance: Normal appearance  Pulmonary:      Effort: Pulmonary effort is normal    Abdominal:      General: Abdomen is flat  Bowel sounds are normal  There is no distension  Palpations: Abdomen is soft  There is no mass  Tenderness: There is no abdominal tenderness  There is no guarding or rebound  Hernia: No hernia is present  Comments: Incision sites appear well-healed; no incisional defect appreciated on exam; no current erythema, no ecchymosis, no induration; no signs of infection   Musculoskeletal:      Comments: Normal gait   Skin:     General: Skin is warm and dry  Neurological:      Mental Status: She is alert     Psychiatric:         Mood and Affect: Mood normal        She did not appear to need Covid 19 testing

## 2020-08-20 NOTE — ASSESSMENT & PLAN NOTE
She is complaining of intermittent incisional irritation at both her upper right and lower right incision sites once a month or two-notes no pain but itches for around 20 minutes at a time  Reports the incision line stays white with some redness around the periphery- "like a hive" which resolves on its own  No associated fever,chills, nausea or vomiting  No factors that make it worse  Not associated with meals  On exam today-incision sites are well-healed; no erythema, no irritation  I did not appreciate any defect to the area  Advised patient that she should continue use of 1% hydrocortizone cream prn  Reassured her that this is less concerning since she is not experiencing pain  Advised on when to seek more immediate medical attention  If it continues to bother her in the future we can consider CT of abdomen to further evaluate  She is agreeable to conservative approach for now and will let me know if she is having problems with the area  After the visit I did review with Dr Lukas Wallace above

## 2020-08-20 NOTE — ASSESSMENT & PLAN NOTE
She was low in vitamin D on prior labs-did not add extra vitamin D-advised she will need at least 2000 IU extra per day-expect levels to be low but this will be rechecked with her routine labs now

## 2020-08-20 NOTE — PATIENT INSTRUCTIONS
It was great to see you again today  KEEP UP THE GOOD WORK  For incisional irritation-use hydrocortizone 1% topical cream as needed  If you develop a bulge associate with pain, fever,chills, nausea/vomiting/let us know/go to ER for evaluation    If it causes you pain I can order a CT of your abdomen to evaluate this further  Follow-up in one year  We kindly ask that you arrive 15 minutes before your scheduled appointment time with your provider to allow you to be roomed, have your vital signs checked and your chart updated by our staff  We thank you for your patience at your visit  Your appointment time is reserved only for you  If you are unable to make your scheduled visit, we would request that you call to cancel and reschedule it at your earliest convenience  Follow diet as discussed  Follow  vitamin and mineral recommendations as reviewed with you  Bariatric vitamins are highly recommended  Vitamins are important for a life-time  Low levels can lead to deficiency which can lead to other medical problems  Exercise as tolerated    If you have gotten a lab slip at this visit, please note that most labs are FASTING-but you need to drink water the night before and the morning before your labs are done  It is HIGHLY RECOMMENDED that you check with your insurance to make sure all the labs ordered are covered by your individual insurance policy  This is especially important if you also get labs done by other providers outside of [facility]  You want to avoid having duplicate labs done  Note you will be given a lab slip AFTER  your annual visit next year to check your vitamin and mineral levels  You will not need to make a second appointment after the labs are received/reviewed  You will receive a letter/and or phone call with your results  Most labs do NOT honor a lab slip dated one year in advance now      IMPORTANT if you have a [facility] "[address]" account, you will receive a letter of your vitamin/mineral results through the computer  Please watch for an update to your chart-since recommendations for supplement adjustments will be sent to you this way  Call our office if he have any problems with abdominal pain especially if associated with fever, chills, nausea, vomiting or any other concerns  All  Post-bariatric surgery patients should be aware that very small quantities of any alcohol  can cause impairment and it is very possible not to feel the effect  The effect can be in the system for several hours  It is also a stomach irritant  It is advised to AVOID alcohol, Nonsteroidal antiinflammatory drugs (NSAIDS) and nicotine of all forms   Any of these can cause stomach irritation/pain

## 2020-08-20 NOTE — ASSESSMENT & PLAN NOTE
Weight stable within a couple pounds of last year-current bmi of 25 1-she is content with current weight-appears physically fit on exam

## 2020-08-20 NOTE — ASSESSMENT & PLAN NOTE
Malabsorption- patient is at risk for malabsorption of vitamins/minerals secondary to malabsorption from her procedure and restriction of intakes  Reviewed current supplements and advised on same  She is taking one procare health mvi with 18 mg of iron and 2-500 mg calcium citrate with D  She is due for her routine labs-ordered same

## 2020-08-20 NOTE — ASSESSMENT & PLAN NOTE
-s/p Myrtle-En-Y Gastric Bypass with Dr Magui Soler on 8/7/2017  Overall doing Well  Initial:232 lb  Current:137 lb  EWL: 99% which remains above average weight loss  Brendan:129 5 lb-one year post-op  Current BMI is Body mass index is 25 06 kg/m²      Tolerating a regular diet-yes  Eating at least 60 grams of protein per day-yes  Following 30/60 minute rule with liquids-yes  Drinking at least 64 ounces of fluid per day-yes  Drinking carbonated beverages-no  Sufficient exercise-yes  Using NSAIDs regularly-no  Using nicotine-no  Using alcohol-yes a glass of wine monthly-advised on effect after her surgery and encouraged to abstain

## 2020-10-27 ENCOUNTER — TELEPHONE (OUTPATIENT)
Dept: BARIATRICS | Facility: CLINIC | Age: 35
End: 2020-10-27

## 2020-10-27 DIAGNOSIS — E61.1 IRON DEFICIENCY: Primary | ICD-10-CM

## 2020-10-27 DIAGNOSIS — Z98.84 BARIATRIC SURGERY STATUS: ICD-10-CM

## 2020-10-27 DIAGNOSIS — K91.2 POSTSURGICAL MALABSORPTION: ICD-10-CM

## 2021-09-10 ENCOUNTER — OFFICE VISIT (OUTPATIENT)
Dept: BARIATRICS | Facility: CLINIC | Age: 36
End: 2021-09-10
Payer: COMMERCIAL

## 2021-09-10 VITALS
TEMPERATURE: 97.1 F | SYSTOLIC BLOOD PRESSURE: 110 MMHG | HEART RATE: 104 BPM | DIASTOLIC BLOOD PRESSURE: 68 MMHG | BODY MASS INDEX: 26.68 KG/M2 | HEIGHT: 62 IN | WEIGHT: 145 LBS

## 2021-09-10 DIAGNOSIS — K91.2 POSTSURGICAL MALABSORPTION: ICD-10-CM

## 2021-09-10 DIAGNOSIS — Z98.84 BARIATRIC SURGERY STATUS: ICD-10-CM

## 2021-09-10 DIAGNOSIS — Z48.815 ENCOUNTER FOR SURGICAL AFTERCARE FOLLOWING SURGERY OF DIGESTIVE SYSTEM: Primary | ICD-10-CM

## 2021-09-10 DIAGNOSIS — D50.9 IRON DEFICIENCY ANEMIA: ICD-10-CM

## 2021-09-10 PROCEDURE — 99214 OFFICE O/P EST MOD 30 MIN: CPT | Performed by: PHYSICIAN ASSISTANT

## 2021-09-10 NOTE — PATIENT INSTRUCTIONS
· Follow-up in 1 year  We kindly ask that your arrive 15 minutes before your scheduled appointment time with your provider to allow our staff to room you, get your vital signs and update your chart  · Get lab work done  Please call the office if you need a script  It is recommended to check with your insurance BEFORE getting labs done to make sure they are covered by your policy  · Call our office if you have any problems with abdominal pain especially associated with fever, chills, nausea, vomiting or any other concerns  · All  Post-bariatric surgery patients should be aware that very small quantities of any alcohol can cause impairment and it is very possible not to feel the effect  The effect can be in the system for several hours  It is also a stomach irritant  · It is advised to AVOID alcohol, Nonsteroidal antiinflammatory drugs (NSAIDS) and nicotine of all forms   Any of these can cause stomach irritation/pain  · Discussed the effects of alcohol on a bariatric patient and the increased impairment risk

## 2021-09-10 NOTE — PROGRESS NOTES
Assessment/Plan:     Patient ID: Bucky Deng is a 39 y o  female  Bariatric Surgery Status    -s/p Myrtle-En-Y Gastric Bypass with Dr Bushra Kaur on 8/7/2017  Presents to the office today for annual  Doing very well    · Continued/Maintain healthy weight loss with good nutrition intakes  · Adequate hydration with at least 64oz  fluid intake  · Follow diet as discussed  · Follow vitamin and mineral recommendations as reviewed with you  · Exercise as tolerated  · Colonoscopy referral made: out of age range    · Follow-up in 1 year  We kindly ask that your arrive 15 minutes before your scheduled appointment time with your provider to allow our staff to room you, get your vital signs and update your chart  · Get lab work done  Please call the office if you need a script  It is recommended to check with your insurance BEFORE getting labs done to make sure they are covered by your policy  · Call our office if you have any problems with abdominal pain especially associated with fever, chills, nausea, vomiting or any other concerns  · All  Post-bariatric surgery patients should be aware that very small quantities of any alcohol can cause impairment and it is very possible not to feel the effect  The effect can be in the system for several hours  It is also a stomach irritant  · It is advised to AVOID alcohol, Nonsteroidal antiinflammatory drugs (NSAIDS) and nicotine of all forms   Any of these can cause stomach irritation/pain  · Discussed the effects of alcohol on a bariatric patient and the increased impairment risk  · Keep up the good work!      Postsurgical Malabsorption   -At risk for malabsorption of vitamins/minerals secondary to malabsorption and restriction of intake from bariatric surgery  -Currently taking adequate postop bariatric surgery vitamin supplementation  -recent labs done at outside facility review with patient and all wnl; iron labs followed by hematology and she receives iron infusions   -Next set of bariatric labs ordered for approximately 2 weeks  -Patient received education about the importance of adhering to a lifelong supplementation regimen to avoid vitamin/mineral deficiencies      Diagnoses and all orders for this visit:    Encounter for surgical aftercare following surgery of digestive system  -     Vitamin D 25 hydroxy; Future  -     Zinc; Future  -     Vitamin B1, whole blood; Future  -     Vitamin A; Future  -     PTH, intact; Future    Bariatric surgery status  -     Vitamin D 25 hydroxy; Future  -     Zinc; Future  -     Vitamin B1, whole blood; Future  -     Vitamin A; Future  -     PTH, intact; Future    Postsurgical malabsorption  -     Vitamin D 25 hydroxy; Future  -     Zinc; Future  -     Vitamin B1, whole blood; Future  -     Vitamin A; Future  -     PTH, intact; Future    Iron deficiency anemia  -     Vitamin D 25 hydroxy; Future  -     Zinc; Future  -     Vitamin B1, whole blood; Future  -     Vitamin A; Future  -     PTH, intact; Future         Subjective:      Patient ID: Gissell Pastor is a 39 y o  female  -s/p Myrtle-En-Y Gastric Bypass with Dr Hillery Duane on 8/7/2017  Presents to the office today for annual  Doing very well      Initial: 232  Current: 145  EWL: (Weight loss is ahead of schedule at this post surgical period )  Brendan: 129 5  Current BMI is Body mass index is 26 52 kg/m²  · Tolerating a regular diet-yes  · Eating at least 60 grams of protein per day-yes  · Following 30/60 minute rule with liquids-yes  · Drinking at least 64 ounces of fluid per day-yes  · Drinking carbonated beverages-rare  · Sufficient exercise-yes  · Using NSAIDs regularly-no  · Using nicotine-no  · Using alcohol-social   · Supplements: procare with iron + 2 calcium chews     · EWL is 90%, which places the patient ahead of schedule for expected post surgical weight loss at this time       The following portions of the patient's history were reviewed and updated as appropriate: allergies, current medications, past family history, past medical history, past social history, past surgical history and problem list     Review of Systems   Constitutional: Negative  Respiratory: Negative  Cardiovascular: Negative  Gastrointestinal: Negative  Neurological: Negative  Psychiatric/Behavioral: Negative  Objective:    /68 (BP Location: Left arm, Patient Position: Sitting, Cuff Size: Standard)   Pulse 104   Temp (!) 97 1 °F (36 2 °C) (Tympanic)   Ht 5' 2" (1 575 m)   Wt 65 8 kg (145 lb)   BMI 26 52 kg/m²      Physical Exam  Vitals and nursing note reviewed  Constitutional:       Appearance: Normal appearance  She is obese  HENT:      Head: Normocephalic and atraumatic  Eyes:      Extraocular Movements: Extraocular movements intact  Pupils: Pupils are equal, round, and reactive to light  Cardiovascular:      Rate and Rhythm: Normal rate and regular rhythm  Pulmonary:      Effort: Pulmonary effort is normal       Breath sounds: Normal breath sounds  Abdominal:      General: Bowel sounds are normal    Musculoskeletal:         General: Normal range of motion  Cervical back: Normal range of motion  Skin:     General: Skin is warm and dry  Neurological:      General: No focal deficit present  Mental Status: She is alert and oriented to person, place, and time     Psychiatric:         Mood and Affect: Mood normal

## 2021-10-07 DIAGNOSIS — K91.2 POSTSURGICAL MALABSORPTION: ICD-10-CM

## 2021-10-07 DIAGNOSIS — E83.52 HIGH CALCIUM LEVELS: ICD-10-CM

## 2021-10-07 DIAGNOSIS — Z48.815 ENCOUNTER FOR SURGICAL AFTERCARE FOLLOWING SURGERY OF DIGESTIVE SYSTEM: Primary | ICD-10-CM

## 2021-10-07 DIAGNOSIS — Z98.84 BARIATRIC SURGERY STATUS: ICD-10-CM

## 2021-10-07 DIAGNOSIS — R79.0 HIGH ZINC LEVEL: ICD-10-CM

## 2022-09-22 ENCOUNTER — OFFICE VISIT (OUTPATIENT)
Dept: BARIATRICS | Facility: CLINIC | Age: 37
End: 2022-09-22
Payer: COMMERCIAL

## 2022-09-22 VITALS
DIASTOLIC BLOOD PRESSURE: 80 MMHG | TEMPERATURE: 97.9 F | HEART RATE: 91 BPM | HEIGHT: 62 IN | WEIGHT: 146.5 LBS | SYSTOLIC BLOOD PRESSURE: 110 MMHG | BODY MASS INDEX: 26.96 KG/M2

## 2022-09-22 DIAGNOSIS — Z98.84 BARIATRIC SURGERY STATUS: ICD-10-CM

## 2022-09-22 DIAGNOSIS — K91.2 POSTSURGICAL MALABSORPTION: ICD-10-CM

## 2022-09-22 DIAGNOSIS — Z48.815 ENCOUNTER FOR SURGICAL AFTERCARE FOLLOWING SURGERY OF DIGESTIVE SYSTEM: Primary | ICD-10-CM

## 2022-09-22 DIAGNOSIS — K28.9 MARGINAL ULCER: ICD-10-CM

## 2022-09-22 PROCEDURE — 99213 OFFICE O/P EST LOW 20 MIN: CPT | Performed by: NURSE PRACTITIONER

## 2022-09-22 RX ORDER — OMEPRAZOLE 40 MG/1
CAPSULE, DELAYED RELEASE ORAL
COMMUNITY
Start: 2022-09-08

## 2022-09-22 RX ORDER — DILTIAZEM HYDROCHLORIDE 120 MG/1
CAPSULE, EXTENDED RELEASE ORAL
COMMUNITY

## 2022-09-22 NOTE — PROGRESS NOTES
Assessment/Plan:     Patient ID: Kelly Ramsey is a 44 y o  female  Bariatric Surgery Status/Marginal ulcer    -s/p Myrtle-En-Y Gastric Bypass with Dr Veronica Madrid on 08/07/2017  Presents to the office today for annual visit  Overall doing fair - was recently dx of stomach ulcer "10 mm at cratered ulcer in the [facility] anastomosis"  and is now on omeprazole 40 mg PO QD which she reports it is effective  Believes it is stress induced  She will be having a repeat EGD with her GI team - "Digestive Disease," in reading  Overall doing well, tolerating a regular diet  Denies having any abdominal pain, N/V/C, regurgitation, reflux or dysphagia  Does have diarrhea which she is getting a work up with GI  PLAN:     - Advised patient to continue to follow up with GI  Advised patient if she continues to have issue to please let us know  Recommended to start on carafate to her with ulceration, however patient would like to defer for now  Reports omeprazole has been effective  Plan is to have repeat EGD in December    - Routine follow up in 1 year  - Continue with healthy lifestyle, adequate protein intake of 60 gm, fluid intake of at least 64 oz  - Continue with MVI daily  - Activity as tolerated  - Labs ordered and will adjust accordingly if any deficiency  Patient has shown labs that she has received from other places and follows with hematology  Will order the ones needed  - Follow up with RD and SW as needed  · Continued/Maintain healthy weight loss with good nutrition intakes  · Adequate hydration with at least 64oz  fluid intake  · Follow diet as discussed  · Follow vitamin and mineral recommendations as reviewed with you  · Exercise as tolerated  · Colonoscopy referral made: not of age range  · Mammogram - not of age range    · Follow-up in 1 year for annual visit   We kindly ask that your arrive 15 minutes before your scheduled appointment time with your provider to allow our staff to room you, get your vital signs and update your chart  · Get lab work done  Please call the office if you need a script  It is recommended to check with your insurance BEFORE getting labs done to make sure they are covered by your policy  · Call our office if you have any problems with abdominal pain especially associated with fever, chills, nausea, vomiting or any other concerns  · All  Post-bariatric surgery patients should be aware that very small quantities of any alcohol can cause impairment and it is very possible not to feel the effect  The effect can be in the system for several hours  It is also a stomach irritant  · It is advised to AVOID alcohol, Nonsteroidal antiinflammatory drugs (NSAIDS) and nicotine of all forms   Any of these can cause stomach irritation/pain  · Discussed the effects of alcohol on a bariatric patient and the increased impairment risk  · Keep up the good work! Postsurgical Malabsorption   -At risk for malabsorption of vitamins/minerals secondary to malabsorption and restriction of intake from bariatric surgery  -Currently taking adequate postop bariatric surgery vitamin supplementation  -Last set of bariatric labs completed on 10/2021 and showed WNL  -Next set of bariatric labs ordered for approximately 2 weeks  -Patient received education about the importance of adhering to a lifelong supplementation regimen to avoid vitamin/mineral deficiencies      Diagnoses and all orders for this visit:    Encounter for surgical aftercare following surgery of digestive system  -     Zinc; Future  -     Vitamin B1, whole blood; Future  -     Vitamin A; Future  -     Folate; Future    Bariatric surgery status  -     Zinc; Future  -     Vitamin B1, whole blood; Future  -     Vitamin A; Future  -     Folate; Future    Postsurgical malabsorption  -     Zinc; Future  -     Vitamin B1, whole blood; Future  -     Vitamin A; Future  -     Folate;  Future    BMI 26 0-26 9,adult  -     Zinc; Future  -     Vitamin B1, whole blood; Future  -     Vitamin A; Future  -     Folate; Future    Marginal ulcer    Other orders  -     diltiazem (TIAZAC) 120 MG 24 hr capsule; diltiazem  mg capsule,24 hr,extended release (Patient not taking: Reported on 9/22/2022)  -     omeprazole (PriLOSEC) 40 MG capsule; 1 (ONE) CAPSULE BEFORE BREAKAST (30 MIN)         Subjective:      Patient ID: Brenda Millard is a 44 y o  female  -s/p Myrtle-En-Y Gastric Bypass with Dr Miranda Pack on 08/07/2017  Presents to the office today for annual visit  Overall doing fair - was recently dx of stomach ulcer "10 mm at cratered ulcer in the [address] [place] [address] anastomosis"  and is now on omeprazole 40 mg PO QD which she reports it is effective  Believes it is stress induced  She will be having a repeat EGD with her GI team - "Digestive Disease," in reading  Overall doing well, tolerating a regular diet  Denies having any abdominal pain, N/V/C, regurgitation, reflux or dysphagia  Does have diarrhea which she is getting a work up with GI  Initial: 250 lbs    Current: 146 5 lbs  EWL: (Weight loss is ahead of schedule at this post surgical period )  Brendan: 120 lbs  Current BMI is Body mass index is 26 8 kg/m²  · Tolerating a regular diet-yes  · Eating at least 60 grams of protein per day-yes  · Following 30/60 minute rule with liquids-yes - 30/30   · Drinking at least 64 ounces of fluid per day-yes  · Drinking carbonated beverages- rarely   · Sufficient exercise-yes  · Using NSAIDs regularly-no  · Using nicotine-no  · Using alcohol- Socially   · Supplements: Multivitamins, Iron, Calcium  and MAGNESIUM    · EWL is 89%, which places the patient ahead of schedule for expected post surgical weight loss at this time       The following portions of the patient's history were reviewed and updated as appropriate: allergies, current medications, past family history, past medical history, past social history, past surgical history and problem list     Review of Systems   Constitutional: Positive for fatigue  Respiratory: Negative  Cardiovascular: Negative  Gastrointestinal: Positive for nausea (chronc - undergoing a GI workup )  Musculoskeletal: Negative  Neurological: Negative  Psychiatric/Behavioral: Negative  Objective:    /80 (BP Location: Left arm, Patient Position: Sitting, Cuff Size: Standard)   Pulse 91   Temp 97 9 °F (36 6 °C) (Tympanic)   Ht 5' 2" (1 575 m)   Wt 66 5 kg (146 lb 8 oz)   BMI 26 80 kg/m²      Physical Exam  Vitals and nursing note reviewed  Constitutional:       Appearance: Normal appearance  Cardiovascular:      Rate and Rhythm: Normal rate and regular rhythm  Pulses: Normal pulses  Heart sounds: Normal heart sounds  Pulmonary:      Effort: Pulmonary effort is normal       Breath sounds: Normal breath sounds  Abdominal:      General: Bowel sounds are normal       Palpations: Abdomen is soft  Tenderness: There is no abdominal tenderness  Musculoskeletal:         General: Normal range of motion  Skin:     General: Skin is warm and dry  Neurological:      General: No focal deficit present  Mental Status: She is alert and oriented to person, place, and time  Psychiatric:         Mood and Affect: Mood normal          Behavior: Behavior normal          Thought Content:  Thought content normal          Judgment: Judgment normal

## 2022-12-07 DIAGNOSIS — Z98.84 BARIATRIC SURGERY STATUS: Primary | ICD-10-CM

## 2022-12-07 DIAGNOSIS — K28.9 MARGINAL ULCER: ICD-10-CM

## 2022-12-07 RX ORDER — SUCRALFATE ORAL 1 G/10ML
1 SUSPENSION ORAL 4 TIMES DAILY
Qty: 1200 ML | Refills: 2 | Status: SHIPPED | OUTPATIENT
Start: 2022-12-07

## 2023-03-30 ENCOUNTER — OFFICE VISIT (OUTPATIENT)
Dept: BARIATRICS | Facility: CLINIC | Age: 38
End: 2023-03-30

## 2023-03-30 VITALS
SYSTOLIC BLOOD PRESSURE: 140 MMHG | TEMPERATURE: 97.6 F | BODY MASS INDEX: 29.63 KG/M2 | DIASTOLIC BLOOD PRESSURE: 88 MMHG | HEART RATE: 82 BPM | WEIGHT: 161 LBS | HEIGHT: 62 IN

## 2023-03-30 DIAGNOSIS — Z98.84 S/P GASTRIC BYPASS: ICD-10-CM

## 2023-03-30 DIAGNOSIS — K91.2 POSTSURGICAL MALABSORPTION: ICD-10-CM

## 2023-03-30 DIAGNOSIS — K28.9 MARGINAL ULCER: Primary | ICD-10-CM

## 2023-03-30 RX ORDER — MISOPROSTOL 100 UG/1
100 TABLET ORAL 4 TIMES DAILY
Qty: 180 TABLET | Refills: 0 | Status: SHIPPED | OUTPATIENT
Start: 2023-03-30 | End: 2023-05-14

## 2023-03-30 NOTE — PROGRESS NOTES
FOLLOW UP VISIT - BARIATRIC SURGERY  Cristina Hobbs 43 y o  female MRN: [id number]  Unit/Bed#:  Encounter: [id number]      HPI:  Cristina Hobbs is a 43 y o  female who presents status post laparoscopic Joanne-en-Y gastric bypass in 2017  Review of Systems   Gastrointestinal: Positive for abdominal pain  All other systems reviewed and are negative  Historical Information   Past Medical History:   Diagnosis Date   • Asthma     sport induced asthma - albuteral   • Bariatric surgery status    • Hyperlipidemia    • Hypertension    • Irregular heart beat     on med for the same  • Irritable bowel syndrome    • Morbid obesity ([address] )    • PONV (postoperative nausea and vomiting)     with general anesthesia  • Postgastrectomy malabsorption    • Ulcer, stomal 2022   • Wears contact lenses    • Wears glasses      Past Surgical History:   Procedure Laterality Date   •  SECTION     • ENDOMETRIAL ABLATION     • ESOPHAGOGASTRODUODENOSCOPY N/A 2017    Procedure: ESOPHAGOGASTRODUODENOSCOPY (EGD); Surgeon: Quilla Cockayne, MD;  Location: [facility];  Service: Bariatrics   • HYSTERECTOMY      partial   • MYOMECTOMY      with ablation   • NASAL SINUS SURGERY     • IL EGD TRANSORAL BIOPSY SINGLE/MULTIPLE N/A 2017    Procedure: ESOPHAGOGASTRODUODENOSCOPY (EGD) with bx;  Surgeon: Kaz Love MD;  Location: [facility];   Service: Bariatrics   • IL LAP GASTRIC BYPASS/JOANNE-EN-Y N/A 2017    Procedure: BYPASS GASTRIC  JOANNE-EN-Y LAPAROSCOPIC;  Surgeon: Quilla Cockayne, MD;  Location: [facility];  Service: Bariatrics   • TUBAL LIGATION     • WISDOM TOOTH EXTRACTION       Social History   Social History     Substance and Sexual Activity   Alcohol Use Yes    Comment: SOCIAL      Social History     Substance and Sexual Activity   Drug Use No     Social History     Tobacco Use   Smoking Status Never   Smokeless Tobacco Never     Family History: non-contributory    Meds/Allergies   all medications and allergies reviewed  Allergies   Allergen Reactions   • Influenza Virus Vaccine [Influenza Virus Vaccine] Swelling     Face/ears/arms  No throat/tongue swelling   • Penicillins Anaphylaxis   • Sulfa Antibiotics Rash   • Covid-19 (Adenovirus) Vaccine Hives     Pfizer vaccine   • Influenza Virus Vaccine H5n1 Hives and Swelling   • Iodinated Contrast Media        Objective       Current Vitals:            Invasive Devices     None                 Physical Exam  Vitals reviewed  Constitutional:       General: She is not in acute distress  Appearance: She is well-developed  She is not diaphoretic  HENT:      Head: Normocephalic and atraumatic  Right Ear: External ear normal       Left Ear: External ear normal       Nose: Nose normal    Eyes:      General: No scleral icterus  Right eye: No discharge  Left eye: No discharge  Conjunctiva/sclera: Conjunctivae normal    Neurological:      Mental Status: She is alert and oriented to person, place, and time  Psychiatric:         Behavior: Behavior normal          Thought Content: Thought content normal          Judgment: Judgment normal          Lab Results: I have personally reviewed pertinent lab results  Imaging: I have personally reviewed pertinent reports  EKG, Pathology, and Other Studies: I have personally reviewed pertinent reports  Assessment/PLAN:     43 y o  female status post laparoscopic Myrtle-en-Y gastric bypass with me in 2017  She has lost 89% excess weight loss and 37% total body weight  She was seen last September by our nurse practitioner and it was documented that she had a history of a marginal ulcer  The plan was to repeat an endoscopy but I do not see any record that this was done  He brought a copy of the last endoscopy that was done that demonstrated a fairly large marginal ulcer  She denies smoking, using nonsteroidals or drinking alcohol    She tells me that she drinks a fair amount of coffee  Fortunately she was only taking a PPI but not other medications  I have explained to her that I am maximizing her therapy with PPI twice a day, Carafate and misoprostol 4 times a day and I am schedule her to have a repeat endoscopy with me in 6 to 8 weeks  Been the fact that she is not smoking and we will going to stop the coffee there is a good chance that this will heal but if it does not despite of maximal therapy she may need a revision with a redo GJ anastomosis  I had a detailed discussion with her stressing the fact that long-term success is largely dependent on compliance and abidance to the recommendations of the program as well as participation within the support groups  She is committed to continue to observe her diet and to increase her physical activity  She will follow up with us as scheduled      Erik Sandra MD  3/30/2023  3:56 PM

## 2023-05-02 ENCOUNTER — TELEPHONE (OUTPATIENT)
Dept: BARIATRICS | Facility: CLINIC | Age: 38
End: 2023-05-02

## 2023-05-02 NOTE — TELEPHONE ENCOUNTER
Called and LVM to ensure pt knew their EGD was scheduled for next week 5/12  Let pt know to call us with any concerns or questions [phone]   Reminded pt not to eat or drink anything after midnight and to have a

## 2023-05-11 RX ORDER — SODIUM CHLORIDE 9 MG/ML
125 INJECTION, SOLUTION INTRAVENOUS CONTINUOUS
Status: CANCELLED | OUTPATIENT
Start: 2023-05-11

## 2023-05-12 ENCOUNTER — HOSPITAL ENCOUNTER (OUTPATIENT)
Dept: GASTROENTEROLOGY | Facility: HOSPITAL | Age: 38
Setting detail: OUTPATIENT SURGERY
Discharge: HOME | End: 2023-05-12
Attending: SURGERY | Admitting: SURGERY

## 2023-05-12 ENCOUNTER — ANESTHESIA EVENT (OUTPATIENT)
Dept: GASTROENTEROLOGY | Facility: HOSPITAL | Age: 38
End: 2023-05-12

## 2023-05-12 ENCOUNTER — ANESTHESIA (OUTPATIENT)
Dept: GASTROENTEROLOGY | Facility: HOSPITAL | Age: 38
End: 2023-05-12

## 2023-05-12 VITALS
DIASTOLIC BLOOD PRESSURE: 59 MMHG | HEIGHT: 62 IN | SYSTOLIC BLOOD PRESSURE: 105 MMHG | TEMPERATURE: 98.5 F | RESPIRATION RATE: 18 BRPM | OXYGEN SATURATION: 97 % | HEART RATE: 82 BPM | WEIGHT: 150 LBS | BODY MASS INDEX: 27.6 KG/M2

## 2023-05-12 DIAGNOSIS — Z98.84 BARIATRIC SURGERY STATUS: ICD-10-CM

## 2023-05-12 RX ORDER — PROPOFOL 10 MG/ML
INJECTION, EMULSION INTRAVENOUS AS NEEDED
Status: DISCONTINUED | OUTPATIENT
Start: 2023-05-12 | End: 2023-05-12

## 2023-05-12 RX ORDER — LIDOCAINE HYDROCHLORIDE 20 MG/ML
INJECTION, SOLUTION EPIDURAL; INFILTRATION; INTRACAUDAL; PERINEURAL AS NEEDED
Status: DISCONTINUED | OUTPATIENT
Start: 2023-05-12 | End: 2023-05-12

## 2023-05-12 RX ORDER — SODIUM CHLORIDE 9 MG/ML
125 INJECTION, SOLUTION INTRAVENOUS CONTINUOUS
Status: DISCONTINUED | OUTPATIENT
Start: 2023-05-12 | End: 2023-05-16 | Stop reason: HOSPADM

## 2023-05-12 RX ADMIN — PROPOFOL 160 MG: 10 INJECTION, EMULSION INTRAVENOUS at 11:24

## 2023-05-12 RX ADMIN — SODIUM CHLORIDE 125 ML/HR: 0.9 INJECTION, SOLUTION INTRAVENOUS at 10:48

## 2023-05-12 RX ADMIN — LIDOCAINE HYDROCHLORIDE 100 MG: 20 INJECTION, SOLUTION EPIDURAL; INFILTRATION; INTRACAUDAL; PERINEURAL at 11:24

## 2023-05-12 RX ADMIN — PROPOFOL 40 MG: 10 INJECTION, EMULSION INTRAVENOUS at 11:26

## 2023-05-12 NOTE — ANESTHESIA PREPROCEDURE EVALUATION
Procedure:  EGD    Relevant Problems   CARDIO   (+) Hyperlipidemia   (+) Migraine      GI/HEPATIC   (+) Bariatric surgery status      MUSCULOSKELETAL   (+) Chondromalacia patellae      NEURO/PSYCH   (+) Migraine      PULMONARY   (+) Asthma      Other   (+) Marginal ulcer   (+) S/P gastric bypass        Physical Exam    Airway    Mallampati score: II  TM Distance: >3 FB  Neck ROM: full     Dental   No notable dental hx     Cardiovascular  Rhythm: regular, Rate: normal, Cardiovascular exam normal    Pulmonary  Pulmonary exam normal Breath sounds clear to auscultation,     Other Findings        Anesthesia Plan  ASA Score- 2     Anesthesia Type- IV sedation with anesthesia with ASA Monitors  Additional Monitors:   Airway Plan:     Comment: GA prn  Plan Factors-    Chart reviewed  Patient summary reviewed  Patient is not a current smoker  Patient not instructed to abstain from smoking on day of procedure  Patient did not smoke on day of surgery  Induction- intravenous  Postoperative Plan-     Informed Consent- Anesthetic plan and risks discussed with patient  I personally reviewed this patient with the CRNA  Discussed and agreed on the Anesthesia Plan with the CRNA  Jose Luis Pennington

## 2023-05-12 NOTE — H&P
"This is a 43 y o  female status post laparoscopic Myrtle-en-Y gastric bypass with me in 2017  She has lost 89% excess weight loss and 37% total body weight  She was seen last September by our nurse practitioner and it was documented that she had a history of a marginal ulcer  The plan was to repeat an endoscopy but I do not see any record that this was done  He brought a copy of the last endoscopy that was done that demonstrated a fairly large marginal ulcer      She denies smoking, using nonsteroidals or drinking alcohol  She tells me that she drinks a fair amount of coffee      Unfortunately she was only taking a PPI but not other medications      I have explained to her that I am maximizing her therapy with PPI twice a day, Carafate and misoprostol 4 times a day and I am schedule her to have a repeat endoscopy with me in 6 to 8 weeks      Since she is not smoking and we will  stop the coffee there is a good chance that this will heal but if it does not despite of maximal therapy she may need a revision with a redo GJ anastomosis  Here for an EGD to evaluate the anatomy of the GI tract  Physical Exam    /66   Pulse 76   Temp 98 5 °F (36 9 °C) (Temporal)   Resp 16   Ht 5' 2\" (1 575 m)   Wt 68 kg (150 lb)   LMP 07/05/2017 (Exact Date)   SpO2 99%   BMI 27 44 kg/m²    AAOx3  RRR  CTA B  Abdomen obese  Benign  A/P:    This is a 43 y o  female status post a gastric bypass in 2017 and history of marginal ulcer  Will proceed with the EGD and biopsies        Josh Ferris MD  05/12/23  11:19 AM  "

## 2023-05-12 NOTE — ANESTHESIA POSTPROCEDURE EVALUATION
"Post-Op Assessment Note    CV Status:  Stable    Pain management: adequate     Mental Status:  Alert and awake   Hydration Status:  Euvolemic   PONV Controlled:  Controlled   Airway Patency:  Patent      Post Op Vitals Reviewed: Yes      Staff: Anesthesiologist, CRNA         No notable events documented      BP      Temp      Pulse     Resp      SpO2      /59   Pulse 82   Temp 98 5 °F (36 9 °C) (Temporal)   Resp 18   Ht 5' 2\" (1 575 m)   Wt 68 kg (150 lb)   LMP 07/05/2017 (Exact Date)   SpO2 97%   BMI 27 44 kg/m²     "

## 2023-05-25 ENCOUNTER — TELEPHONE (OUTPATIENT)
Dept: BARIATRICS | Facility: CLINIC | Age: 38
End: 2023-05-25

## 2023-05-25 ENCOUNTER — OFFICE VISIT (OUTPATIENT)
Dept: BARIATRICS | Facility: CLINIC | Age: 38
End: 2023-05-25

## 2023-05-25 VITALS
DIASTOLIC BLOOD PRESSURE: 80 MMHG | TEMPERATURE: 97.7 F | HEART RATE: 97 BPM | HEIGHT: 62 IN | SYSTOLIC BLOOD PRESSURE: 140 MMHG | BODY MASS INDEX: 28.71 KG/M2 | WEIGHT: 156 LBS

## 2023-05-25 DIAGNOSIS — Z98.84 S/P GASTRIC BYPASS: ICD-10-CM

## 2023-05-25 DIAGNOSIS — K28.9 MARGINAL ULCER: Primary | ICD-10-CM

## 2023-05-25 NOTE — PROGRESS NOTES
FOLLOW UP VISIT - BARIATRIC SURGERY  Darius Bray 43 y o  female MRN: [id number]  Unit/Bed#:  Encounter: [id number]      HPI:  Darius Bray is a 43 y o  female who presents status post laparoscopic Joanne-en-Y gastric bypass in 2017  Recently had been diagnosed with marginal ulcer  Here to follow up repeat EGD  Review of Systems   Gastrointestinal: Positive for abdominal pain  All other systems reviewed and are negative  Historical Information   Past Medical History:   Diagnosis Date   • Asthma     sport induced asthma - albuteral   • Bariatric surgery status    • Hyperlipidemia    • Hypertension    • Irregular heart beat     on med for the same  • Irritable bowel syndrome    • Morbid obesity ([address] )    • PONV (postoperative nausea and vomiting)     with general anesthesia  • Postgastrectomy malabsorption    • Ulcer, stomal 2022   • Wears contact lenses    • Wears glasses      Past Surgical History:   Procedure Laterality Date   •  SECTION     • ENDOMETRIAL ABLATION     • ESOPHAGOGASTRODUODENOSCOPY N/A 2017    Procedure: ESOPHAGOGASTRODUODENOSCOPY (EGD); Surgeon: Lev Chand MD;  Location: [facility];  Service: Bariatrics   • HYSTERECTOMY      partial   • MYOMECTOMY      with ablation   • NASAL SINUS SURGERY     • [place] EGD TRANSORAL BIOPSY SINGLE/MULTIPLE N/A 2017    Procedure: ESOPHAGOGASTRODUODENOSCOPY (EGD) with bx;  Surgeon: Anabella Ashley MD;  Location: [facility];   Service: Bariatrics   • NJ LAPS GSTR RSTCV PX W/BYP JOANNE-EN-Y LIMB <150 CM N/A 2017    Procedure: BYPASS GASTRIC  JOANNE-EN-Y LAPAROSCOPIC;  Surgeon: Lev Chand MD;  Location: [facility];  Service: Bariatrics   • TUBAL LIGATION     • WISDOM TOOTH EXTRACTION       Social History   Social History     Substance and Sexual Activity   Alcohol Use Yes    Comment: SOCIAL      Social History     Substance and Sexual Activity   Drug Use Yes   • Types: Marijuana    Comment: not lately     Social "History     Tobacco Use   Smoking Status Never   Smokeless Tobacco Never     Family History: non-contributory    Meds/Allergies   all medications and allergies reviewed  Allergies   Allergen Reactions   • Influenza Virus Vaccine [Influenza Virus Vaccine] Swelling     Face/ears/arms  No throat/tongue swelling   • Penicillins Anaphylaxis   • Sulfa Antibiotics Rash   • Covid-19 (Adenovirus) Vaccine Hives     Pfizer vaccine   • Influenza Virus Vaccine H5n1 Hives and Swelling   • Iodinated Contrast Media        Objective       Current Vitals:   Blood Pressure: 140/80 (05/25/23 1557)  Pulse: 97 (05/25/23 1557)  Temperature: 97 7 °F (36 5 °C) (05/25/23 1557)  Temp Source: Tympanic (05/25/23 1557)  Height: 5' 2\" (157 5 cm) (05/25/23 1557)  Weight - Scale: 70 8 kg (156 lb) (05/25/23 1557)        Invasive Devices     None                 Physical Exam  Vitals reviewed  Constitutional:       General: She is not in acute distress  Appearance: She is well-developed  She is not diaphoretic  HENT:      Head: Normocephalic and atraumatic  Right Ear: External ear normal       Left Ear: External ear normal       Nose: Nose normal    Eyes:      General: No scleral icterus  Right eye: No discharge  Left eye: No discharge  Conjunctiva/sclera: Conjunctivae normal    Neurological:      Mental Status: She is alert and oriented to person, place, and time  Psychiatric:         Behavior: Behavior normal          Thought Content: Thought content normal          Judgment: Judgment normal          Lab Results: I have personally reviewed pertinent lab results  Imaging: I have personally reviewed pertinent reports  EKG, Pathology, and Other Studies: I have personally reviewed pertinent reports  Assessment/PLAN:     43 y o  female status post laparoscopic Myrtle-en-Y gastric bypass with Dr Kari Conner in 2017  She has lost 89% excess weight loss and 37% total body weight    She was seen last September and " it was documented that she had a history of a marginal ulcer  Prior endoscopy that was done that demonstrated a large marginal ulcer  She denies smoking, using nonsteroidals or drinking alcohol  She also has quit drinking coffee  Stop the misoprostol  Finish the current prescription of carafate with any remaining refills  Wean omeprazole- currently on 40 BID, and can go to 20 BID, then just 20 daily  She has been on maximal medical therapy w/ PPI twice a day, Carafate and misoprostol 4 times a day and I am schedule her to have a repeat endoscopy with me in 6 to 8 weeks  We think there's a good chance that this will heal but if it does not despite of maximal therapy she may need a revision with a redo GJ anastomosis  I had a detailed discussion with her stressing the fact that long-term success is largely dependent on compliance and abidance to the recommendations of the program as well as participation within the support groups  She is committed to continue to observe her diet and to increase her physical activity  She will follow up with us as scheduled      Wilson Gowers, MD  5/25/2023  4:16 PM

## 2023-05-25 NOTE — TELEPHONE ENCOUNTER
Spoke to patient to see if possible coming in earlier today  Patient stated that she lives an hr away and had to adjust her work schedule and can not make it any earlier

## 2023-05-31 DIAGNOSIS — Z98.84 BARIATRIC SURGERY STATUS: Primary | ICD-10-CM

## 2023-05-31 DIAGNOSIS — K28.9 MARGINAL ULCER: ICD-10-CM

## 2023-05-31 DIAGNOSIS — Z98.84 BARIATRIC SURGERY STATUS: ICD-10-CM

## 2023-05-31 RX ORDER — OMEPRAZOLE 40 MG/1
40 CAPSULE, DELAYED RELEASE ORAL 2 TIMES DAILY
Qty: 180 CAPSULE | Refills: 1 | Status: SHIPPED | OUTPATIENT
Start: 2023-05-31 | End: 2023-06-01

## 2023-06-01 RX ORDER — OMEPRAZOLE 40 MG/1
40 CAPSULE, DELAYED RELEASE ORAL 2 TIMES DAILY
Qty: 180 CAPSULE | Refills: 1 | Status: SHIPPED | OUTPATIENT
Start: 2023-06-01

## 2023-07-26 DIAGNOSIS — Z98.84 S/P GASTRIC BYPASS: ICD-10-CM

## 2023-07-26 DIAGNOSIS — K28.9 MARGINAL ULCER: ICD-10-CM

## 2023-07-26 DIAGNOSIS — K91.2 POSTSURGICAL MALABSORPTION: ICD-10-CM

## 2023-07-27 RX ORDER — MISOPROSTOL 100 UG/1
100 TABLET ORAL 4 TIMES DAILY
Qty: 120 TABLET | Refills: 1 | Status: SHIPPED | OUTPATIENT
Start: 2023-07-27 | End: 2023-09-10

## 2023-09-01 ENCOUNTER — TELEPHONE (OUTPATIENT)
Dept: BARIATRICS | Facility: CLINIC | Age: 38
End: 2023-09-01

## 2023-11-24 DIAGNOSIS — K28.9 MARGINAL ULCER: ICD-10-CM

## 2023-11-24 DIAGNOSIS — Z98.84 BARIATRIC SURGERY STATUS: ICD-10-CM

## 2023-11-24 RX ORDER — OMEPRAZOLE 40 MG/1
40 CAPSULE, DELAYED RELEASE ORAL DAILY
Qty: 90 CAPSULE | Refills: 1 | Status: SHIPPED | OUTPATIENT
Start: 2023-11-24

## 2023-11-24 RX ORDER — OMEPRAZOLE 20 MG/1
20 CAPSULE, DELAYED RELEASE ORAL 2 TIMES DAILY
Qty: 180 CAPSULE | Refills: 0 | Status: SHIPPED | OUTPATIENT
Start: 2023-11-24 | End: 2023-11-24

## 2025-01-08 ENCOUNTER — TELEPHONE (OUTPATIENT)
Age: 40
End: 2025-01-08

## 2025-01-08 NOTE — TELEPHONE ENCOUNTER
Pt called to schedule an appt with Dr Oliveros.  I transferred her to Anitha in the office to help  her

## 2025-01-17 ENCOUNTER — PREP FOR PROCEDURE (OUTPATIENT)
Dept: BARIATRICS | Facility: CLINIC | Age: 40
End: 2025-01-17

## 2025-01-17 ENCOUNTER — OFFICE VISIT (OUTPATIENT)
Dept: BARIATRICS | Facility: CLINIC | Age: 40
End: 2025-01-17
Payer: COMMERCIAL

## 2025-01-17 VITALS
SYSTOLIC BLOOD PRESSURE: 114 MMHG | BODY MASS INDEX: 30.46 KG/M2 | WEIGHT: 165.5 LBS | HEART RATE: 98 BPM | DIASTOLIC BLOOD PRESSURE: 76 MMHG | HEIGHT: 62 IN | TEMPERATURE: 97.5 F

## 2025-01-17 DIAGNOSIS — K91.2 POSTSURGICAL MALABSORPTION: ICD-10-CM

## 2025-01-17 DIAGNOSIS — Z98.84 S/P GASTRIC BYPASS: ICD-10-CM

## 2025-01-17 DIAGNOSIS — Z98.84 BARIATRIC SURGERY STATUS: Primary | ICD-10-CM

## 2025-01-17 DIAGNOSIS — K28.9 MARGINAL ULCER: ICD-10-CM

## 2025-01-17 DIAGNOSIS — Z98.84 BARIATRIC SURGERY STATUS: ICD-10-CM

## 2025-01-17 PROCEDURE — 99213 OFFICE O/P EST LOW 20 MIN: CPT | Performed by: SURGERY

## 2025-01-17 RX ORDER — MISOPROSTOL 100 UG/1
100 TABLET ORAL 4 TIMES DAILY
Qty: 120 TABLET | Refills: 1 | Status: SHIPPED | OUTPATIENT
Start: 2025-01-17 | End: 2025-03-03

## 2025-01-17 RX ORDER — SUCRALFATE ORAL 1 G/10ML
1 SUSPENSION ORAL 4 TIMES DAILY
Qty: 1200 ML | Refills: 2 | Status: SHIPPED | OUTPATIENT
Start: 2025-01-17

## 2025-01-17 NOTE — PROGRESS NOTES
Date of surgery:8/17/2017  Procedure: RNY   Performing surgeon: Dr. Oliveros     Initial Weight - 232 lb  Current Weight -165 lb  Brendan Weight -  120lb  Total Body Weight Loss (EWL)- 66%  EWL% - 69%  TWB % -29%

## 2025-01-17 NOTE — PROGRESS NOTES
FOLLOW UP VISIT - BARIATRIC SURGERY  Ghada Myers 39 y.o. female MRN: [id number]  Unit/Bed#:  Encounter: [id number]      HPI:  Ghada Myers is a 39 y.o. female who presents status post gastric bypass.      Review of Systems    Historical Information   Past Medical History:   Diagnosis Date    Asthma     sport induced asthma - albuteral    Bariatric surgery status     Hyperlipidemia     Hypertension     Irregular heart beat     on med for the same.    Irritable bowel syndrome     Morbid obesity ([facility])     PONV (postoperative nausea and vomiting)     with general anesthesia.    Postgastrectomy malabsorption     Ulcer, stomal 2022    Wears contact lenses     Wears glasses      Past Surgical History:   Procedure Laterality Date     SECTION      ENDOMETRIAL ABLATION      ESOPHAGOGASTRODUODENOSCOPY N/A 2017    Procedure: ESOPHAGOGASTRODUODENOSCOPY (EGD);  Surgeon: Ruel Oliveros MD;  Location: [facility];  Service: Bariatrics    HYSTERECTOMY      partial    MYOMECTOMY      with ablation    NASAL SINUS SURGERY      DC EGD TRANSORAL BIOPSY SINGLE/MULTIPLE N/A 2017    Procedure: ESOPHAGOGASTRODUODENOSCOPY (EGD) with bx;  Surgeon: Francis Salazar MD;  Location: [facility];  Service: Bariatrics    DC LAPS GSTR RSTCV PX W/BYP JOANNE-EN-Y LIMB <150 CM N/A 2017    Procedure: BYPASS GASTRIC  JOANNE-EN-Y LAPAROSCOPIC;  Surgeon: Ruel Oliveros MD;  Location[facility];  Service: Bariatrics    TUBAL LIGATION      WISDOM TOOTH EXTRACTION       Social History   Social History     Substance and Sexual Activity   Alcohol Use Yes    Comment: SOCIAL      Social History     Substance and Sexual Activity   Drug Use Yes    Types: Marijuana    Comment: not lately     Social History     Tobacco Use   Smoking Status Never   Smokeless Tobacco Never     Family History: Family history non-contributory    Meds/Allergies   all medications and allergies reviewed  Allergies   Allergen Reactions    Influenza Virus  "Vaccine [Influenza Virus Vaccine] Swelling     Face/ears/arms. No throat/tongue swelling    Penicillins Anaphylaxis    Sulfa Antibiotics Rash    Covid-19 (Adenovirus) Vaccine Hives     Pfizer vaccine    Influenza Virus Vaccine H5n1 Hives and Swelling    Iodinated Contrast Media        Objective       Current Vitals:   Blood Pressure: 114/76 (01/17/25 1419)  Pulse: 98 (01/17/25 1419)  Temperature: 97.5 °F (36.4 °C) (01/17/25 1419)  Temp Source: Tympanic (01/17/25 1419)  Height: 5' 2\" (157.5 cm) (01/17/25 1419)  Weight - Scale: 75.1 kg (165 lb 8 oz) (01/17/25 1419)        Invasive Devices       None                   Physical Exam    Lab Results: I have personally reviewed pertinent lab results.    Imaging: Results Review Statement: No pertinent imaging studies reviewed.  EKG, Pathology, and Other Studies: Results Review Statement: No pertinent imaging studies reviewed.      Assessment/PLAN:    39 y.o. female status post laparoscopic Myrtle-en-Y gastric bypass with me  in 2017.     She had lost about 100 lbs and maintained for almost 6 years.    She was seen last September and it was documented that she had a history of a marginal ulcer.     Prior endoscopy that was done that demonstrated a large marginal ulcer.     She denied smoking, using nonsteroidals or drinking alcohol.  She also had quit drinking coffee.    Repeat endoscopy demonstrated a remi improvement of her ulcer with just a small residual linear and no evidence of stricture.    The biopsies at the time were negative for H. Pylori.    I have not seen her since but recently she had similar symptomatology and underwent an endoscopy in [facility] that showed 2 marginal ulcers that measure about 4 to 5 mm.  There was no evidence of anastomotic stricture and thus far she tells me the biopsies were negative for H. pylori.    I am prescribing her twice a day PPI, 4 times a day Carafate and Cytotec and I am planning on repeating her endoscopy in 6 to 8 " weeks.    She continues to deny smoking or the use of nonsteroidals and is drinking only about a cup of coffee a day.    I had a detailed discussion with her stressing the fact that long-term success is largely dependent on compliance and abidance to the recommendations of the program as well as participation within the support groups.    She is committed to continue to observe her diet and to increase her physical activity.    She will follow up with us as scheduled.    Ruel Oliveros MD  1/17/2025  2:26 PM      .

## 2025-01-21 ENCOUNTER — TELEPHONE (OUTPATIENT)
Dept: BARIATRICS | Facility: CLINIC | Age: 40
End: 2025-01-21

## 2025-02-12 ENCOUNTER — TELEPHONE (OUTPATIENT)
Dept: BARIATRICS | Facility: CLINIC | Age: 40
End: 2025-02-12

## 2025-02-12 NOTE — TELEPHONE ENCOUNTER
PA for Sucralfate 1 g SUBMITTED to [employer]    via    [x]CMM-KEY: [id number]  []Surescripts-Case ID #   []Availity-Auth ID # NDC #   []Faxed to plan   []Other website   []Phone call Case ID #     []PA sent as URGENT    All office notes, labs and other pertaining documents and studies sent. Clinical questions answered. Awaiting determination from insurance company.     Turnaround time for your insurance to make a decision on your Prior Authorization can take 7-21 business days.

## 2025-02-12 NOTE — TELEPHONE ENCOUNTER
PA for Sucralfate 1 g APPROVED     Date(s) approved Unknown - awaiting approval letter    Case #    Patient advised by          []Imperatorhart Message  []Phone call   [x]LMOM  []L/M to call office as no active Communication consent on file  []Unable to leave detailed message as VM not approved on Communication consent       Pharmacy advised by    []Fax  [x]Phone call    Approval letter scanned into [employer] No - awaiting approval letter

## 2025-02-25 ENCOUNTER — TELEPHONE (OUTPATIENT)
Dept: BARIATRICS | Facility: CLINIC | Age: 40
End: 2025-02-25

## 2025-02-25 NOTE — TELEPHONE ENCOUNTER
DESTINYM for pt discussing EGD instructions for a procedure on 3/5/25 with Dr. Oliveros. Pt advised to call office if any questions or concerns. Pt also sent MyChart of instructions.

## 2025-03-04 RX ORDER — ONDANSETRON 2 MG/ML
4 INJECTION INTRAMUSCULAR; INTRAVENOUS ONCE AS NEEDED
Status: CANCELLED | OUTPATIENT
Start: 2025-03-04

## 2025-03-04 RX ORDER — SODIUM CHLORIDE, SODIUM LACTATE, POTASSIUM CHLORIDE, CALCIUM CHLORIDE 600; 310; 30; 20 MG/100ML; MG/100ML; MG/100ML; MG/100ML
125 INJECTION, SOLUTION INTRAVENOUS CONTINUOUS
Status: CANCELLED | OUTPATIENT
Start: 2025-03-04

## 2025-03-05 ENCOUNTER — ANESTHESIA (OUTPATIENT)
Dept: GASTROENTEROLOGY | Facility: HOSPITAL | Age: 40
End: 2025-03-05
Payer: COMMERCIAL

## 2025-03-05 ENCOUNTER — ANESTHESIA EVENT (OUTPATIENT)
Dept: GASTROENTEROLOGY | Facility: HOSPITAL | Age: 40
End: 2025-03-05
Payer: COMMERCIAL

## 2025-03-05 ENCOUNTER — HOSPITAL ENCOUNTER (OUTPATIENT)
Dept: GASTROENTEROLOGY | Facility: HOSPITAL | Age: 40
Setting detail: OUTPATIENT SURGERY
Discharge: HOME | End: 2025-03-05
Attending: SURGERY
Payer: COMMERCIAL

## 2025-03-05 VITALS
DIASTOLIC BLOOD PRESSURE: 73 MMHG | TEMPERATURE: 98.2 F | RESPIRATION RATE: 17 BRPM | HEART RATE: 79 BPM | SYSTOLIC BLOOD PRESSURE: 115 MMHG | OXYGEN SATURATION: 100 %

## 2025-03-05 DIAGNOSIS — Z98.84 BARIATRIC SURGERY STATUS: ICD-10-CM

## 2025-03-05 PROCEDURE — 88342 IMHCHEM/IMCYTCHM 1ST ANTB: CPT | Performed by: PATHOLOGY

## 2025-03-05 PROCEDURE — 88305 TISSUE EXAM BY PATHOLOGIST: CPT | Performed by: PATHOLOGY

## 2025-03-05 PROCEDURE — 43239 EGD BIOPSY SINGLE/MULTIPLE: CPT | Performed by: SURGERY

## 2025-03-05 RX ORDER — SODIUM CHLORIDE, SODIUM LACTATE, POTASSIUM CHLORIDE, CALCIUM CHLORIDE 600; 310; 30; 20 MG/100ML; MG/100ML; MG/100ML; MG/100ML
125 INJECTION, SOLUTION INTRAVENOUS CONTINUOUS
Status: DISCONTINUED | OUTPATIENT
Start: 2025-03-05 | End: 2025-03-09 | Stop reason: HOSPADM

## 2025-03-05 RX ORDER — LIDOCAINE HYDROCHLORIDE 20 MG/ML
INJECTION, SOLUTION EPIDURAL; INFILTRATION; INTRACAUDAL; PERINEURAL AS NEEDED
Status: DISCONTINUED | OUTPATIENT
Start: 2025-03-05 | End: 2025-03-05

## 2025-03-05 RX ORDER — PROPOFOL 10 MG/ML
INJECTION, EMULSION INTRAVENOUS AS NEEDED
Status: DISCONTINUED | OUTPATIENT
Start: 2025-03-05 | End: 2025-03-05

## 2025-03-05 RX ADMIN — PROPOFOL 40 MG: 10 INJECTION, EMULSION INTRAVENOUS at 07:58

## 2025-03-05 RX ADMIN — PROPOFOL 40 MG: 10 INJECTION, EMULSION INTRAVENOUS at 08:00

## 2025-03-05 RX ADMIN — LIDOCAINE HYDROCHLORIDE 50 MG: 20 INJECTION, SOLUTION EPIDURAL; INFILTRATION; INTRACAUDAL at 07:56

## 2025-03-05 RX ADMIN — PROPOFOL 160 MG: 10 INJECTION, EMULSION INTRAVENOUS at 07:56

## 2025-03-05 RX ADMIN — SODIUM CHLORIDE, SODIUM LACTATE, POTASSIUM CHLORIDE, AND CALCIUM CHLORIDE 125 ML/HR: .6; .31; .03; .02 INJECTION, SOLUTION INTRAVENOUS at 07:33

## 2025-03-05 NOTE — ANESTHESIA PREPROCEDURE EVALUATION
Procedure:  EGD    Relevant Problems   ANESTHESIA   (+) PONV (postoperative nausea and vomiting) (Resolved)      CARDIO   (+) Hyperlipidemia   (+) Hypertension   (+) Migraine      MUSCULOSKELETAL   (+) Chondromalacia patellae      NEURO/PSYCH   (+) Migraine      PULMONARY   (+) Asthma      Surgery/Wound/Pain   (+) S/P gastric bypass        Physical Exam    Airway    Mallampati score: II         Dental       Cardiovascular  Rhythm: regular, Rate: normal    Pulmonary   Breath sounds clear to auscultation    Other Findings  post-pubertal.      Anesthesia Plan  ASA Score- 2     Anesthesia Type- IV sedation with anesthesia with ASA Monitors.         Additional Monitors:     Airway Plan:            Plan Factors-Exercise tolerance (METS): >4 METS.    Chart reviewed.   Existing labs reviewed. Patient summary reviewed.    Patient is not a current smoker.      Obstructive sleep apnea risk education given perioperatively.        Induction- intravenous.    Postoperative Plan-     Perioperative Resuscitation Plan - Level 1 - Full Code.       Informed Consent- Anesthetic plan and risks discussed with patient.        NPO Status:  No vitals data found for the desired time range.

## 2025-03-05 NOTE — ANESTHESIA POSTPROCEDURE EVALUATION
Post-Op Assessment Note    CV Status:  Stable  Pain Score: 1    Pain management: adequate       Mental Status:  Alert and awake   Hydration Status:  Euvolemic   PONV Controlled:  Controlled   Airway Patency:  Patent     Post Op Vitals Reviewed: Yes    No anethesia notable event occurred.    Staff: Anesthesiologist           Last Filed PACU Vitals:  Vitals Value Taken Time   Temp     Pulse 79 03/05/25 0820   /73 03/05/25 0820   Resp 17 03/05/25 0820   SpO2 100 % 03/05/25 0820       Modified Juancarlos:     Vitals Value Taken Time   Activity 2 03/05/25 0820   Respiration 2 03/05/25 0820   Circulation 2 03/05/25 0820   Consciousness 2 03/05/25 0820   Oxygen Saturation 2 03/05/25 0820     Modified Juancarlos Score: 10

## 2025-03-05 NOTE — H&P
40 y.o. female status post laparoscopic Myrtle-en-Y gastric bypass with me  in 2017.     She had lost about 100 lbs and maintained for almost 6 years.     She was seen last September and it was documented that she had a history of a marginal ulcer.     Prior endoscopy that was done that demonstrated a large marginal ulcer.     She denied smoking, using nonsteroidals or drinking alcohol.  She also had quit drinking coffee.     Repeat endoscopy demonstrated a remi improvement of her ulcer with just a small residual linear and no evidence of stricture.     The biopsies at the time were negative for H. Pylori.     I have not seen her since but recently she had similar symptomatology and underwent an endoscopy in [facility] that showed 2 marginal ulcers that measure about 4 to 5 mm.  There was no evidence of anastomotic stricture and thus far she tells me the biopsies were negative for H. pylori.     I am prescribing her twice a day PPI, 4 times a day Carafate and Cytotec and I am planning on repeating her endoscopy in 6 to 8 weeks.     She continues to deny smoking or the use of nonsteroidals and is drinking only about a cup of coffee a day.    Here for an EGD to evaluate the anatomy of the GI tract.    Physical Exam    /82   Pulse 77   Temp 98.2 °F (36.8 °C)   Resp 18   LMP 07/05/2017 (Exact Date)   SpO2 98%    AAOx3  RRR  CTA B  Abdomen obese. Benign.      A/P:    This is a 40 y.o. female status post a gastric bypass in 2017 and history of marginal ulcers.    Will proceed with the EGD and biopsies.      Ruel Oliveros MD  03/05/25  7:22 AM

## 2025-03-06 ENCOUNTER — PREP FOR PROCEDURE (OUTPATIENT)
Dept: BARIATRICS | Facility: CLINIC | Age: 40
End: 2025-03-06

## 2025-03-06 ENCOUNTER — TELEPHONE (OUTPATIENT)
Dept: BARIATRICS | Facility: CLINIC | Age: 40
End: 2025-03-06

## 2025-03-06 DIAGNOSIS — Z98.84 BARIATRIC SURGERY STATUS: Primary | ICD-10-CM

## 2025-03-06 NOTE — TELEPHONE ENCOUNTER
Patient called back, tried to warm tx to office but no one was available, please call her back to schedule EGD, thanks.

## 2025-03-06 NOTE — TELEPHONE ENCOUNTER
LVM to return call and schedule repeat EGD in 6-8 wks with Dr Oliveros and then reschedule review appointment afterwards with Dr Oliveros.  (Current appointment on 3/26 has been cancelled).    EGD:  6 wks=4/16 or 8 wks=4/30

## 2025-03-11 PROCEDURE — 88342 IMHCHEM/IMCYTCHM 1ST ANTB: CPT | Performed by: PATHOLOGY

## 2025-03-11 PROCEDURE — 88305 TISSUE EXAM BY PATHOLOGIST: CPT | Performed by: PATHOLOGY

## 2025-04-04 ENCOUNTER — TELEPHONE (OUTPATIENT)
Dept: BARIATRICS | Facility: CLINIC | Age: 40
End: 2025-04-04

## 2025-04-04 NOTE — TELEPHONE ENCOUNTER
Contacted pt in regards to an appt 6/4/25 at 1 pm with Dr. Oliveros. Provider will be out of office and appt needed to be rescheduled. Pt was rescheduled 6/5/25 at 4 pm with Dr. Oliveros.

## 2025-05-05 ENCOUNTER — TELEPHONE (OUTPATIENT)
Dept: BARIATRICS | Facility: CLINIC | Age: 40
End: 2025-05-05

## 2025-05-12 RX ORDER — SODIUM CHLORIDE, SODIUM LACTATE, POTASSIUM CHLORIDE, CALCIUM CHLORIDE 600; 310; 30; 20 MG/100ML; MG/100ML; MG/100ML; MG/100ML
125 INJECTION, SOLUTION INTRAVENOUS CONTINUOUS
Status: CANCELLED | OUTPATIENT
Start: 2025-05-12

## 2025-05-12 RX ORDER — SODIUM CHLORIDE 9 MG/ML
125 INJECTION, SOLUTION INTRAVENOUS CONTINUOUS
Status: CANCELLED | OUTPATIENT
Start: 2025-05-12

## 2025-05-14 ENCOUNTER — ANESTHESIA EVENT (OUTPATIENT)
Dept: GASTROENTEROLOGY | Facility: HOSPITAL | Age: 40
End: 2025-05-14
Payer: COMMERCIAL

## 2025-05-14 ENCOUNTER — HOSPITAL ENCOUNTER (OUTPATIENT)
Dept: GASTROENTEROLOGY | Facility: HOSPITAL | Age: 40
Setting detail: OUTPATIENT SURGERY
Discharge: HOME | End: 2025-05-14
Attending: SURGERY
Payer: COMMERCIAL

## 2025-05-14 ENCOUNTER — ANESTHESIA (OUTPATIENT)
Dept: GASTROENTEROLOGY | Facility: HOSPITAL | Age: 40
End: 2025-05-14
Payer: COMMERCIAL

## 2025-05-14 VITALS
TEMPERATURE: 98.2 F | HEIGHT: 62 IN | WEIGHT: 165 LBS | SYSTOLIC BLOOD PRESSURE: 133 MMHG | HEART RATE: 79 BPM | OXYGEN SATURATION: 97 % | RESPIRATION RATE: 17 BRPM | BODY MASS INDEX: 30.36 KG/M2 | DIASTOLIC BLOOD PRESSURE: 77 MMHG

## 2025-05-14 DIAGNOSIS — Z98.84 BARIATRIC SURGERY STATUS: ICD-10-CM

## 2025-05-14 PROCEDURE — 43239 EGD BIOPSY SINGLE/MULTIPLE: CPT | Performed by: SURGERY

## 2025-05-14 PROCEDURE — 88305 TISSUE EXAM BY PATHOLOGIST: CPT | Performed by: STUDENT IN AN ORGANIZED HEALTH CARE EDUCATION/TRAINING PROGRAM

## 2025-05-14 RX ORDER — SODIUM CHLORIDE, SODIUM LACTATE, POTASSIUM CHLORIDE, CALCIUM CHLORIDE 600; 310; 30; 20 MG/100ML; MG/100ML; MG/100ML; MG/100ML
125 INJECTION, SOLUTION INTRAVENOUS CONTINUOUS
Status: DISCONTINUED | OUTPATIENT
Start: 2025-05-14 | End: 2025-05-18 | Stop reason: HOSPADM

## 2025-05-14 RX ORDER — LIDOCAINE HYDROCHLORIDE 20 MG/ML
INJECTION, SOLUTION EPIDURAL; INFILTRATION; INTRACAUDAL; PERINEURAL AS NEEDED
Status: DISCONTINUED | OUTPATIENT
Start: 2025-05-14 | End: 2025-05-14

## 2025-05-14 RX ORDER — PROPOFOL 10 MG/ML
INJECTION, EMULSION INTRAVENOUS AS NEEDED
Status: DISCONTINUED | OUTPATIENT
Start: 2025-05-14 | End: 2025-05-14

## 2025-05-14 RX ADMIN — LIDOCAINE HYDROCHLORIDE 80 MG: 20 INJECTION, SOLUTION EPIDURAL; INFILTRATION; INTRACAUDAL at 09:54

## 2025-05-14 RX ADMIN — PROPOFOL 50 MG: 10 INJECTION, EMULSION INTRAVENOUS at 10:00

## 2025-05-14 RX ADMIN — PROPOFOL 160 MG: 10 INJECTION, EMULSION INTRAVENOUS at 09:55

## 2025-05-14 RX ADMIN — SODIUM CHLORIDE, SODIUM LACTATE, POTASSIUM CHLORIDE, AND CALCIUM CHLORIDE 125 ML/HR: .6; .31; .03; .02 INJECTION, SOLUTION INTRAVENOUS at 09:31

## 2025-05-14 RX ADMIN — PROPOFOL 40 MG: 10 INJECTION, EMULSION INTRAVENOUS at 09:57

## 2025-05-14 NOTE — ANESTHESIA PREPROCEDURE EVALUATION
Procedure:  EGD    Relevant Problems   CARDIO   (+) Hyperlipidemia   (+) Hypertension   (+) Migraine      NEURO/PSYCH   (+) Migraine      PULMONARY   (+) Asthma      Surgery/Wound/Pain   (+) Bariatric surgery status   (+) Postsurgical malabsorption   (+) S/P gastric bypass        Physical Exam    Airway     Mallampati score: II  TM Distance: >3 FB  Neck ROM: full  Mouth opening: unable to assess      Cardiovascular  Cardiovascular exam normal    Dental   No notable dental hx     Pulmonary  Pulmonary exam normal     Neurological  - normal exam    Other Findings  post-pubertal.      Anesthesia Plan  ASA Score- 2     Anesthesia Type- MAC and general with ASA Monitors.         Additional Monitors:     Airway Plan:            Plan Factors-Exercise tolerance (METS): >4 METS.    Chart reviewed.        Patient is not a current smoker.              Induction-     Postoperative Plan- .   Monitoring Plan - Monitoring plan - standard ASA monitoring          Informed Consent- Anesthetic plan and risks discussed with patient.        NPO Status:  Vitals Value Taken Time   Date of last liquid 05/13/25 05/14/25 09:23   Time of last liquid 2200 05/14/25 09:23   Date of last solid 05/13/25 05/14/25 09:23   Time of last solid 1800 05/14/25 09:23

## 2025-05-14 NOTE — ANESTHESIA POSTPROCEDURE EVALUATION
Post-Op Assessment Note    CV Status:  Stable    Pain management: adequate       Mental Status:  Alert and awake   Hydration Status:  Euvolemic   PONV Controlled:  Controlled   Airway Patency:  Patent     Post Op Vitals Reviewed: Yes    No anethesia notable event occurred.    Staff: Anesthesiologist           Last Filed PACU Vitals:  Vitals Value Taken Time   Temp     Pulse 79 05/14/25 10:16   /77 05/14/25 10:16   Resp 17 05/14/25 10:16   SpO2 97 % 05/14/25 10:16       Modified Juancarlos:     Vitals Value Taken Time   Activity 2 05/14/25 10:23   Respiration 2 05/14/25 10:23   Circulation 2 05/14/25 10:23   Consciousness 2 05/14/25 10:23   Oxygen Saturation 2 05/14/25 10:23     Modified Juancarlos Score: 10

## 2025-05-14 NOTE — H&P
"This is a 40 y.o. female status post laparoscopic Myrtle-en-Y gastric bypass with me  in 2017.     She had lost about 100 lbs and maintained for almost 6 years.     She was seen last September and it was documented that she had a history of a marginal ulcer.     Prior endoscopy that was done that demonstrated a large marginal ulcer.     She denied smoking, using nonsteroidals or drinking alcohol.  She also had quit drinking coffee.     Repeat endoscopy demonstrated a remi improvement of her ulcer with just a small residual linear and no evidence of stricture.     The biopsies at the time were negative for H. Pylori.     I have not seen her since but recently she had similar symptomatology and underwent an endoscopy in [facility] that showed 2 marginal ulcers that measure about 4 to 5 mm.  There was no evidence of anastomotic stricture and thus far she tells me the biopsies were negative for H. pylori.     I am prescribing her twice a day PPI, 4 times a day Carafate and Cytotec and I am planning on repeating her endoscopy in 6 to 8 weeks.     She continues to deny smoking or the use of nonsteroidals and is drinking only about a cup of coffee a day.    Her symptoms have dramatically improved.    Will proceed for repeat endoscopy to rule out an ulcer.    Physical Exam    /81   Pulse 84   Temp 98.2 °F (36.8 °C) (Temporal)   Resp 16   Ht 5' 2\" (1.575 m)   Wt 74.8 kg (165 lb)   LMP 07/05/2017 (Exact Date)   SpO2 98%   BMI 30.18 kg/m²    AAOx3  RRR  CTA B  Abdomen obese. Benign.      A/P:    This is a 40 y.o. female status post a gastric bypass in 2017 and history of marginal ulcers.    Will proceed with the EGD and biopsies.      Ruel Oliveros MD  05/14/25  9:52 AM  "

## 2025-05-19 PROCEDURE — 88305 TISSUE EXAM BY PATHOLOGIST: CPT | Performed by: STUDENT IN AN ORGANIZED HEALTH CARE EDUCATION/TRAINING PROGRAM

## 2025-06-05 ENCOUNTER — OFFICE VISIT (OUTPATIENT)
Dept: BARIATRICS | Facility: CLINIC | Age: 40
End: 2025-06-05
Payer: COMMERCIAL

## 2025-06-05 VITALS
SYSTOLIC BLOOD PRESSURE: 118 MMHG | DIASTOLIC BLOOD PRESSURE: 74 MMHG | WEIGHT: 147.5 LBS | HEIGHT: 62 IN | HEART RATE: 86 BPM | BODY MASS INDEX: 27.14 KG/M2 | TEMPERATURE: 97.6 F

## 2025-06-05 DIAGNOSIS — Z98.84 S/P GASTRIC BYPASS: Primary | ICD-10-CM

## 2025-06-05 DIAGNOSIS — K28.9 MARGINAL ULCER: ICD-10-CM

## 2025-06-05 DIAGNOSIS — R10.13 EPIGASTRIC PAIN: ICD-10-CM

## 2025-06-05 PROCEDURE — 99213 OFFICE O/P EST LOW 20 MIN: CPT | Performed by: SURGERY

## 2025-06-05 NOTE — ASSESSMENT & PLAN NOTE
History of marginal ulcers last year and recently in a endoscopy at [facility].

## 2025-06-05 NOTE — PROGRESS NOTES
FOLLOW UP VISIT - BARIATRIC SURGERY  Ghada Myers 40 y.o. female MRN: [id number]  Unit/Bed#:  Encounter: [id number]      HPI:  Ghada Myers is a 40 y.o. female who presents status post gastric bypass years ago here to review the results of her endoscopy.      Review of Systems   All other systems reviewed and are negative.      Historical Information   Past Medical History[1]  Past Surgical History[2]  Social History   Social History     Substance and Sexual Activity   Alcohol Use Yes    Comment: SOCIAL      Social History     Substance and Sexual Activity   Drug Use Yes    Types: Marijuana    Comment: not lately     Tobacco Use History[3]  Family History: Family history non-contributory    Meds/Allergies   all medications and allergies reviewed  Allergies[4]    Objective       Current Vitals:            Invasive Devices       None                   Physical Exam  Vitals reviewed.   Constitutional:       General: She is not in acute distress.     Appearance: She is well-developed. She is not diaphoretic.   HENT:      Head: Normocephalic and atraumatic.      Right Ear: External ear normal.      Left Ear: External ear normal.      Nose: Nose normal.     Eyes:      General: No scleral icterus.        Right eye: No discharge.         Left eye: No discharge.      Conjunctiva/sclera: Conjunctivae normal.       Neurological:      Mental Status: She is alert and oriented to person, place, and time.     Psychiatric:         Behavior: Behavior normal.         Thought Content: Thought content normal.         Judgment: Judgment normal.         Lab Results: I have personally reviewed pertinent lab results.    Imaging: Results Review Statement: No pertinent imaging studies reviewed.  EKG, Pathology, and Other Studies: Results Review Statement: No pertinent imaging studies reviewed.  The endoscopy from 8/14/2025 revealed pouchitis but resolution of the marginal ulcers with no evidence of anastomotic stricture or  gastrogastric fistula.  Final Diagnosis   A. Stomach, biopsy:  - Oxyntic-type gastric mucosa with mild chronic inactive gastritis.  - No Helicobacter organisms identified on H&E.  - Negative for intestinal metaplasia, dysplasia, and malignancy.           Assessment/PLAN:    S/P gastric bypass  Status post laparoscopic Myrtle-en-Y gastric bypass with me in 2017.  Initially she lost over 100 pounds but has regained about 34 back.    Marginal ulcer  History of marginal ulcers last year and recently in a endoscopy at [facility].    Epigastric pain  40 y.o. female status post laparoscopic Mrytle-en-Y gastric bypass with me  in 2017.     She had lost about 100 lbs and maintained for almost 6 years.     She was seen last September and it was documented that she had a history of a marginal ulcer.     Prior endoscopy that was done that demonstrated a large marginal ulcer.     She denied smoking, using nonsteroidals or drinking alcohol.  She also had quit drinking coffee.     Repeat endoscopy demonstrated a remi improvement of her ulcer with just a small residual linear and no evidence of stricture.     The biopsies at the time were negative for H. Pylori.     I have not seen her since but recently she had similar symptomatology and underwent an endoscopy in [facility] that showed 2 marginal ulcers that measure about 4 to 5 mm.  There was no evidence of anastomotic stricture and thus far she tells me the biopsies were negative for H. pylori.     I prescribed her twice a day PPI, 4 times a day Carafate and Cytotec and schedule her to have a repeat endoscopy 8 weeks later.  Her EGD was performed the week before last and it has shown resolution of her marginal ulcers.    Biopsies were still negative for H. pylori.    She continues to deny smoking or the use of nonsteroidals and is drinking only about a cup of coffee a day.    Symptomatology has resolved.    She has completed treatment with Cytotec and Carafate  and I am instructing her to decrease her PPI to only once a day.    I had a detailed discussion with her stressing the fact that long-term success is largely dependent on compliance and abidance to the recommendations of the program as well as participation within the support groups.    She is committed to continue to observe her diet and to increase her physical activity.    She will follow up with us as scheduled in 3 months to reassess her symptoms.      Ruel Oliveros MD  2025  3:56 PM      .       [1]   Past Medical History:  Diagnosis Date    Asthma     sport induced asthma - albuteral    Bariatric surgery status     Hyperlipidemia     Hypertension     Irregular heart beat     on med for the same.    Irritable bowel syndrome     Morbid obesity (HCC)     PONV (postoperative nausea and vomiting)     with general anesthesia.    Postgastrectomy malabsorption     Ulcer, stomal 2022    Wears contact lenses     Wears glasses    [2]   Past Surgical History:  Procedure Laterality Date     SECTION      ENDOMETRIAL ABLATION      ESOPHAGOGASTRODUODENOSCOPY N/A 2017    Procedure: ESOPHAGOGASTRODUODENOSCOPY (EGD);  Surgeon: Ruel Oliveros MD;  Location: [facility];  Service: Bariatrics    HYSTERECTOMY      partial    MYOMECTOMY      with ablation    NASAL SINUS SURGERY      MN EGD TRANSORAL BIOPSY SINGLE/MULTIPLE N/A 2017    Procedure: ESOPHAGOGASTRODUODENOSCOPY (EGD) with bx;  Surgeon: Francis Salazar MD;  Location: [facility];  Service: Bariatrics    MN LAPS GSTR RSTCV PX W/BYP JOANNE-EN-Y LIMB <150 CM N/A 2017    Procedure: BYPASS GASTRIC  JOANNE-EN-Y LAPAROSCOPIC;  Surgeon: Ruel Oliveros MD;  Location: [facility];  Service: Bariatrics    TUBAL LIGATION      WISDOM TOOTH EXTRACTION     [3]   Social History  Tobacco Use   Smoking Status Never   Smokeless Tobacco Never   [4]   Allergies  Allergen Reactions    Influenza Virus Vaccine [Influenza Virus Vaccine] Swelling     Face/ears/arms. No  throat/tongue swelling    Penicillins Anaphylaxis    Sulfa Antibiotics Rash    Covid-19 (Adenovirus) Vaccine Hives     Pfizer vaccine    Influenza Virus Vaccine H5n1 Hives and Swelling    Iodinated Contrast Media

## 2025-06-05 NOTE — ASSESSMENT & PLAN NOTE
Status post laparoscopic Myrtle-en-Y gastric bypass with me in 2017.  Initially she lost over 100 pounds but has regained about 34 back.

## 2025-06-05 NOTE — ASSESSMENT & PLAN NOTE
40 y.o. female status post laparoscopic Myrtle-en-Y gastric bypass with me  in 2017.     She had lost about 100 lbs and maintained for almost 6 years.     She was seen last September and it was documented that she had a history of a marginal ulcer.     Prior endoscopy that was done that demonstrated a large marginal ulcer.     She denied smoking, using nonsteroidals or drinking alcohol.  She also had quit drinking coffee.     Repeat endoscopy demonstrated a remi improvement of her ulcer with just a small residual linear and no evidence of stricture.     The biopsies at the time were negative for H. Pylori.     I have not seen her since but recently she had similar symptomatology and underwent an endoscopy in [facility] that showed 2 marginal ulcers that measure about 4 to 5 mm.  There was no evidence of anastomotic stricture and thus far she tells me the biopsies were negative for H. pylori.     I prescribed her twice a day PPI, 4 times a day Carafate and Cytotec and schedule her to have a repeat endoscopy 8 weeks later.  Her EGD was performed the week before last and it has shown resolution of her marginal ulcers.    Biopsies were still negative for H. pylori.    She continues to deny smoking or the use of nonsteroidals and is drinking only about a cup of coffee a day.    Symptomatology has resolved.    She has completed treatment with Cytotec and Carafate and I am instructing her to decrease her PPI to only once a day.    I had a detailed discussion with her stressing the fact that long-term success is largely dependent on compliance and abidance to the recommendations of the program as well as participation within the support groups.    She is committed to continue to observe her diet and to increase her physical activity.    She will follow up with us as scheduled in 3 months to reassess her symptoms.

## 2025-06-05 NOTE — PROGRESS NOTES
Date of surgery:2/7/2017  Procedure:RNY  Performing surgeon:    Initial Weight - 232 lb  Current Weight -147.5lb  Brendan Weight - 129 lb  Total Body Weight Loss (EWL)- 84.4%  EWL% - 88%  TWB % -36%  S/B 0/8     For *NEW/TRANSFER* patients only: Who referred the patient? Please provide name and specialty (PCP, GI, etc.).

## (undated) DEVICE — HARMONIC ACE +7 LAPAROSCOPIC SHEARS ADVANCED HEMOSTASIS 5MM DIAMETER 36CM SHAFT LENGTH  FOR USE WITH GRAY HAND PIECE ONLY: Brand: HARMONIC ACE

## (undated) DEVICE — ENDOPATH XCEL UNIVERSAL TROCAR STABLILITY SLEEVES: Brand: ENDOPATH XCEL

## (undated) DEVICE — CHLORAPREP HI-LITE 26ML ORANGE

## (undated) DEVICE — ENDO STITCH 2-0 SURGIDAC 48 IN

## (undated) DEVICE — ENDOPOUCH RETRIEVER SPECIMEN RETRIEVAL BAGS: Brand: ENDOPOUCH RETRIEVER

## (undated) DEVICE — VIOLET BRAIDED (POLYGLACTIN 910), SYNTHETIC ABSORBABLE SUTURE: Brand: COATED VICRYL

## (undated) DEVICE — SINGLE-USE BIOPSY FORCEPS: Brand: RADIAL JAW 4

## (undated) DEVICE — AEM CORD

## (undated) DEVICE — PMI DISPOSABLE PUNCTURE CLOSURE DEVICE / SUTURE GRASPER: Brand: PMI

## (undated) DEVICE — GLOVE SRG BIOGEL 8

## (undated) DEVICE — IRRIG ENDO FLO TUBING

## (undated) DEVICE — ADHESIVE SKIN CLSR DERMABOND NX

## (undated) DEVICE — LAP AEM SCISSOR TIP .75 IN

## (undated) DEVICE — WEBRIL 6 IN UNSTERILE

## (undated) DEVICE — SURGICAL GOWN, XL SMARTSLEEVE: Brand: CONVERTORS

## (undated) DEVICE — SEAMGUARD STPL REINF ENDO GIA ULTRA UNIV 60 PURPLE

## (undated) DEVICE — TROCAR VISIPORT

## (undated) DEVICE — URETERAL CATHETER ADAPTOR TIP

## (undated) DEVICE — BLUE HEAT SCOPE WARMER

## (undated) DEVICE — INTENDED FOR TISSUE SEPARATION, AND OTHER PROCEDURES THAT REQUIRE A SHARP SURGICAL BLADE TO PUNCTURE OR CUT.: Brand: BARD-PARKER SAFETY BLADES SIZE 15, STERILE

## (undated) DEVICE — STAPLER GIA HANDLE STAND 4MM

## (undated) DEVICE — STAPLER ENDO GIA ROTICULATOR 60-2.5

## (undated) DEVICE — SUT MONOCRYL 4-0 PS-2 27 IN Y426H

## (undated) DEVICE — STAPLER EEA 25 MM COVIDIEN

## (undated) DEVICE — TUBING SMOKE EVAC W/FILTRATION DEVICE PLUMEPORT ACTIV

## (undated) DEVICE — ENDOPATH XCEL BLADELESS TROCARS WITH STABILITY SLEEVES: Brand: ENDOPATH XCEL

## (undated) DEVICE — NEEDLE HYPO 22G X 1-1/2 IN

## (undated) DEVICE — SCD SEQUENTIAL COMPRESSION COMFORT SLEEVE MEDIUM KNEE LENGTH: Brand: KENDALL SCD

## (undated) DEVICE — Device

## (undated) DEVICE — GLOVE SRG BIOGEL 7.5

## (undated) DEVICE — TUBING SUCTION 5MM X 12 FT

## (undated) DEVICE — SYRINGE 30ML LL

## (undated) DEVICE — COVIDIEN ENDO GIA PURPLE (MED) RELOAD 60MM

## (undated) DEVICE — 3000CC GUARDIAN II: Brand: GUARDIAN

## (undated) DEVICE — TIBURON LAPAROSCOPIC ABDOMINAL DRAPE: Brand: CONVERTORS

## (undated) DEVICE — [HIGH FLOW INSUFFLATOR,  DO NOT USE IF PACKAGE IS DAMAGED,  KEEP DRY,  KEEP AWAY FROM SUNLIGHT,  PROTECT FROM HEAT AND RADIOACTIVE SOURCES.]: Brand: PNEUMOSURE

## (undated) DEVICE — REM POLYHESIVE ADULT PATIENT RETURN ELECTRODE: Brand: VALLEYLAB

## (undated) DEVICE — TRAVELKIT CONTAINS FIRST STEP KIT (200ML EP-4 KIT) AND SOILED SCOPE BAG - 1 KIT: Brand: TRAVELKIT CONTAINS FIRST STEP KIT AND SOILED SCOPE BAG

## (undated) DEVICE — ALLENTOWN LAP CHOLE APP PACK: Brand: CARDINAL HEALTH

## (undated) DEVICE — ENDO STITCH 2-0 VICRYL

## (undated) DEVICE — 2000CC GUARDIAN II: Brand: GUARDIAN

## (undated) DEVICE — ENDO STITCH DEVICE 10 MM

## (undated) DEVICE — 10 MM BABCOCKS WITH RATCHET HANDLES: Brand: ENDOPATH